# Patient Record
Sex: FEMALE | Race: WHITE | NOT HISPANIC OR LATINO | Employment: OTHER | URBAN - METROPOLITAN AREA
[De-identification: names, ages, dates, MRNs, and addresses within clinical notes are randomized per-mention and may not be internally consistent; named-entity substitution may affect disease eponyms.]

---

## 2017-10-13 ENCOUNTER — HISTORICAL (OUTPATIENT)
Dept: RADIOLOGY | Facility: HOSPITAL | Age: 82
End: 2017-10-13

## 2021-04-07 ENCOUNTER — HISTORICAL (OUTPATIENT)
Dept: ADMINISTRATIVE | Facility: HOSPITAL | Age: 86
End: 2021-04-07

## 2021-04-07 LAB
ABS NEUT (OLG): 6.31 X10(3)/MCL (ref 2.1–9.2)
ALBUMIN SERPL-MCNC: 3 GM/DL (ref 3.4–4.8)
ALBUMIN/GLOB SERPL: 0.9 RATIO (ref 1.1–2)
ALP SERPL-CCNC: 73 UNIT/L (ref 40–150)
ALT SERPL-CCNC: 16 UNIT/L (ref 0–55)
AST SERPL-CCNC: 15 UNIT/L (ref 5–34)
BILIRUB SERPL-MCNC: 0.3 MG/DL (ref 0.2–1.2)
BILIRUBIN DIRECT+TOT PNL SERPL-MCNC: 0.1 MG/DL (ref 0–0.8)
BILIRUBIN DIRECT+TOT PNL SERPL-MCNC: 0.2 MG/DL (ref 0–0.5)
BUN SERPL-MCNC: 23 MG/DL (ref 9.8–20.1)
CALCIUM SERPL-MCNC: 8.7 MG/DL (ref 8.4–10.2)
CHLORIDE SERPL-SCNC: 101 MMOL/L (ref 98–107)
CHOLEST SERPL-MCNC: 95 MG/DL
CHOLEST/HDLC SERPL: 3 {RATIO} (ref 0–5)
CO2 SERPL-SCNC: 28 MMOL/L (ref 23–31)
CREAT SERPL-MCNC: 1.24 MG/DL (ref 0.57–1.11)
DEPRECATED CALCIDIOL+CALCIFEROL SERPL-MC: 26.4 NG/ML (ref 30–80)
ERYTHROCYTE [DISTWIDTH] IN BLOOD BY AUTOMATED COUNT: 13.4 % (ref 11.5–17)
EST. AVERAGE GLUCOSE BLD GHB EST-MCNC: 159.9 MG/DL
GLOBULIN SER-MCNC: 3.2 GM/DL (ref 2.4–3.5)
GLUCOSE SERPL-MCNC: 203 MG/DL (ref 82–115)
HBA1C MFR BLD: 7.2 %
HCT VFR BLD AUTO: 37.5 % (ref 37–47)
HDLC SERPL-MCNC: 30 MG/DL (ref 40–60)
HGB BLD-MCNC: 11.7 GM/DL (ref 12–16)
LDLC SERPL CALC-MCNC: 36 MG/DL (ref 50–140)
MCH RBC QN AUTO: 29.5 PG (ref 27–31)
MCHC RBC AUTO-ENTMCNC: 31.2 GM/DL (ref 33–36)
MCV RBC AUTO: 94.5 FL (ref 80–94)
NRBC BLD AUTO-RTO: 0 % (ref 0–0.2)
PLATELET # BLD AUTO: 328 X10(3)/MCL (ref 130–400)
PMV BLD AUTO: 9.8 FL (ref 7.4–10.4)
POTASSIUM SERPL-SCNC: 4.4 MMOL/L (ref 3.5–5.1)
PROT SERPL-MCNC: 6.2 GM/DL (ref 5.8–7.6)
RBC # BLD AUTO: 3.97 X10(6)/MCL (ref 4.2–5.4)
SODIUM SERPL-SCNC: 139 MMOL/L (ref 136–145)
TRIGL SERPL-MCNC: 147 MG/DL (ref 0–150)
TSH SERPL-ACNC: 1.36 UIU/ML (ref 0.35–4.94)
VLDLC SERPL CALC-MCNC: 29 MG/DL
WBC # SPEC AUTO: 9.4 X10(3)/MCL (ref 4.5–11.5)

## 2021-06-02 ENCOUNTER — HISTORICAL (OUTPATIENT)
Dept: ADMINISTRATIVE | Facility: HOSPITAL | Age: 86
End: 2021-06-02

## 2021-06-02 LAB — PREALB SERPL-MCNC: 20.7 MG/DL (ref 14–37)

## 2021-07-01 ENCOUNTER — HISTORICAL (OUTPATIENT)
Dept: ADMINISTRATIVE | Facility: HOSPITAL | Age: 86
End: 2021-07-01

## 2021-07-01 LAB — DEPRECATED CALCIDIOL+CALCIFEROL SERPL-MC: 26.1 NG/ML (ref 30–80)

## 2021-10-05 ENCOUNTER — HISTORICAL (OUTPATIENT)
Dept: ADMINISTRATIVE | Facility: HOSPITAL | Age: 86
End: 2021-10-05

## 2021-10-05 LAB
ABS NEUT (OLG): 4.89 X10(3)/MCL (ref 2.1–9.2)
ALBUMIN SERPL-MCNC: 3.5 GM/DL (ref 3.4–4.8)
ALBUMIN/GLOB SERPL: 1 RATIO (ref 1.1–2)
ALP SERPL-CCNC: 85 UNIT/L (ref 40–150)
ALT SERPL-CCNC: 31 UNIT/L (ref 0–55)
AST SERPL-CCNC: 19 UNIT/L (ref 5–34)
BILIRUB SERPL-MCNC: 0.4 MG/DL (ref 0.2–1.2)
BILIRUBIN DIRECT+TOT PNL SERPL-MCNC: 0.2 MG/DL (ref 0–0.5)
BILIRUBIN DIRECT+TOT PNL SERPL-MCNC: 0.2 MG/DL (ref 0–0.8)
BUN SERPL-MCNC: 18.6 MG/DL (ref 9.8–20.1)
CALCIUM SERPL-MCNC: 9.6 MG/DL (ref 8.4–10.2)
CHLORIDE SERPL-SCNC: 100 MMOL/L (ref 98–107)
CHOLEST SERPL-MCNC: 112 MG/DL
CHOLEST/HDLC SERPL: 3 {RATIO} (ref 0–5)
CO2 SERPL-SCNC: 27 MMOL/L (ref 23–31)
CREAT SERPL-MCNC: 1.31 MG/DL (ref 0.57–1.11)
DEPRECATED CALCIDIOL+CALCIFEROL SERPL-MC: 34.6 NG/ML (ref 30–80)
ERYTHROCYTE [DISTWIDTH] IN BLOOD BY AUTOMATED COUNT: 12.6 % (ref 11.5–17)
EST. AVERAGE GLUCOSE BLD GHB EST-MCNC: 165.7 MG/DL
GLOBULIN SER-MCNC: 3.5 GM/DL (ref 2.4–3.5)
GLUCOSE SERPL-MCNC: 132 MG/DL (ref 82–115)
HBA1C MFR BLD: 7.4 %
HCT VFR BLD AUTO: 41.8 % (ref 37–47)
HDLC SERPL-MCNC: 36 MG/DL (ref 40–60)
HGB BLD-MCNC: 13.7 GM/DL (ref 12–16)
LDLC SERPL CALC-MCNC: 49 MG/DL (ref 50–140)
MCH RBC QN AUTO: 30.3 PG (ref 27–31)
MCHC RBC AUTO-ENTMCNC: 32.8 GM/DL (ref 33–36)
MCV RBC AUTO: 92.5 FL (ref 80–94)
NRBC BLD AUTO-RTO: 0 % (ref 0–0.2)
PLATELET # BLD AUTO: 333 X10(3)/MCL (ref 130–400)
PMV BLD AUTO: 9.6 FL (ref 7.4–10.4)
POTASSIUM SERPL-SCNC: 4.1 MMOL/L (ref 3.5–5.1)
PROT SERPL-MCNC: 7 GM/DL (ref 5.8–7.6)
RBC # BLD AUTO: 4.52 X10(6)/MCL (ref 4.2–5.4)
SODIUM SERPL-SCNC: 139 MMOL/L (ref 136–145)
TRIGL SERPL-MCNC: 133 MG/DL (ref 0–150)
TSH SERPL-ACNC: 2.75 UIU/ML (ref 0.35–4.94)
VLDLC SERPL CALC-MCNC: 27 MG/DL
WBC # SPEC AUTO: 10 X10(3)/MCL (ref 4.5–11.5)

## 2022-01-04 ENCOUNTER — HISTORICAL (OUTPATIENT)
Dept: ADMINISTRATIVE | Facility: HOSPITAL | Age: 87
End: 2022-01-04

## 2022-01-04 LAB — DEPRECATED CALCIDIOL+CALCIFEROL SERPL-MC: 29.4 NG/ML (ref 30–80)

## 2022-04-04 ENCOUNTER — HISTORICAL (OUTPATIENT)
Dept: ADMINISTRATIVE | Facility: HOSPITAL | Age: 87
End: 2022-04-04

## 2022-04-04 LAB
ABS NEUT (OLG): 4.67 (ref 2.1–9.2)
ALBUMIN SERPL-MCNC: 3 G/DL (ref 3.4–4.8)
ALBUMIN/GLOB SERPL: 0.9 {RATIO} (ref 1.1–2)
ALP SERPL-CCNC: 76 U/L (ref 40–150)
ALT SERPL-CCNC: 18 U/L (ref 0–55)
AST SERPL-CCNC: 17 U/L (ref 5–34)
BILIRUB SERPL-MCNC: 0.4 MG/DL (ref 0.2–1.2)
BILIRUBIN DIRECT+TOT PNL SERPL-MCNC: 0.2 (ref 0–0.5)
BILIRUBIN DIRECT+TOT PNL SERPL-MCNC: 0.2 (ref 0–0.8)
BUN SERPL-MCNC: 19.4 MG/DL (ref 9.8–20.1)
CALCIUM SERPL-MCNC: 8.7 MG/DL (ref 8.4–10.2)
CHLORIDE SERPL-SCNC: 105 MMOL/L (ref 98–107)
CHOLEST SERPL-MCNC: 101 MG/DL
CHOLEST/HDLC SERPL: 3 {RATIO} (ref 0–5)
CO2 SERPL-SCNC: 27 MMOL/L (ref 23–31)
CREAT SERPL-MCNC: 1.19 MG/DL (ref 0.57–1.11)
DEPRECATED CALCIDIOL+CALCIFEROL SERPL-MC: 34.7 NG/ML (ref 30–80)
ERYTHROCYTE [DISTWIDTH] IN BLOOD BY AUTOMATED COUNT: 12.8 % (ref 11.5–17)
EST. AVERAGE GLUCOSE BLD GHB EST-MCNC: 148.5 MG/DL
GLOBULIN SER-MCNC: 3.2 G/DL (ref 2.4–3.5)
GLUCOSE SERPL-MCNC: 125 MG/DL (ref 82–115)
HBA1C MFR BLD: 6.8 %
HCT VFR BLD AUTO: 40.3 % (ref 37–47)
HDLC SERPL-MCNC: 31 MG/DL (ref 40–60)
HEMOLYSIS INTERF INDEX SERPL-ACNC: 3
HGB BLD-MCNC: 13 G/DL (ref 12–16)
ICTERIC INTERF INDEX SERPL-ACNC: 0
LDLC SERPL CALC-MCNC: 45 MG/DL (ref 50–140)
LIPEMIC INTERF INDEX SERPL-ACNC: -1
MCH RBC QN AUTO: 30.2 PG (ref 27–31)
MCHC RBC AUTO-ENTMCNC: 32.3 G/DL (ref 33–36)
MCV RBC AUTO: 93.5 FL (ref 80–94)
NRBC BLD AUTO-RTO: 0 % (ref 0–0.2)
PLATELET # BLD AUTO: 320 10*3/UL (ref 130–400)
PMV BLD AUTO: 9.8 FL (ref 7.4–10.4)
POTASSIUM SERPL-SCNC: 4.1 MMOL/L (ref 3.5–5.1)
PROT SERPL-MCNC: 6.2 G/DL (ref 5.8–7.6)
RBC # BLD AUTO: 4.31 10*6/UL (ref 4.2–5.4)
SODIUM SERPL-SCNC: 142 MMOL/L (ref 136–145)
TRIGL SERPL-MCNC: 127 MG/DL (ref 0–150)
TSH SERPL-ACNC: 2.22 M[IU]/L (ref 0.35–4.94)
VLDLC SERPL CALC-MCNC: 25 MG/DL
WBC # SPEC AUTO: 9.2 10*3/UL (ref 4.5–11.5)

## 2022-06-05 PROCEDURE — 80053 COMPREHEN METABOLIC PANEL: CPT | Performed by: FAMILY MEDICINE

## 2022-06-05 PROCEDURE — 85025 COMPLETE CBC W/AUTO DIFF WBC: CPT | Performed by: FAMILY MEDICINE

## 2022-06-05 PROCEDURE — 87502 INFLUENZA DNA AMP PROBE: CPT | Performed by: FAMILY MEDICINE

## 2022-06-16 ENCOUNTER — LAB REQUISITION (OUTPATIENT)
Dept: LAB | Facility: HOSPITAL | Age: 87
End: 2022-06-16
Attending: FAMILY MEDICINE
Payer: MEDICARE

## 2022-06-16 DIAGNOSIS — J44.9 CHRONIC OBSTRUCTIVE PULMONARY DISEASE, UNSPECIFIED: ICD-10-CM

## 2022-06-16 LAB
ALBUMIN SERPL-MCNC: 2.8 GM/DL (ref 3.4–4.8)
ALBUMIN/GLOB SERPL: 0.8 RATIO (ref 1.1–2)
ALP SERPL-CCNC: 72 UNIT/L (ref 40–150)
ALT SERPL-CCNC: 13 UNIT/L (ref 0–55)
AST SERPL-CCNC: 12 UNIT/L (ref 5–34)
BASOPHILS # BLD AUTO: 0.09 X10(3)/MCL (ref 0–0.2)
BASOPHILS NFR BLD AUTO: 0.9 %
BILIRUBIN DIRECT+TOT PNL SERPL-MCNC: 0.3 MG/DL
BUN SERPL-MCNC: 18.6 MG/DL (ref 9.8–20.1)
CALCIUM SERPL-MCNC: 8.8 MG/DL (ref 8.4–10.2)
CHLORIDE SERPL-SCNC: 104 MMOL/L (ref 98–107)
CO2 SERPL-SCNC: 25 MMOL/L (ref 23–31)
CREAT SERPL-MCNC: 1.16 MG/DL (ref 0.55–1.02)
EOSINOPHIL # BLD AUTO: 0.16 X10(3)/MCL (ref 0–0.9)
EOSINOPHIL NFR BLD AUTO: 1.6 %
ERYTHROCYTE [DISTWIDTH] IN BLOOD BY AUTOMATED COUNT: 13.2 % (ref 11.5–17)
FLUAV AG UPPER RESP QL IA.RAPID: NOT DETECTED
FLUBV AG UPPER RESP QL IA.RAPID: NOT DETECTED
GLOBULIN SER-MCNC: 3.6 GM/DL (ref 2.4–3.5)
GLUCOSE SERPL-MCNC: 217 MG/DL (ref 82–115)
HCT VFR BLD AUTO: 39.8 % (ref 37–47)
HGB BLD-MCNC: 12.7 GM/DL (ref 12–16)
IMM GRANULOCYTES # BLD AUTO: 0.05 X10(3)/MCL (ref 0–0.02)
IMM GRANULOCYTES NFR BLD AUTO: 0.5 % (ref 0–0.43)
LYMPHOCYTES # BLD AUTO: 3.1 X10(3)/MCL (ref 0.6–4.6)
LYMPHOCYTES NFR BLD AUTO: 31.8 %
MCH RBC QN AUTO: 30.7 PG (ref 27–31)
MCHC RBC AUTO-ENTMCNC: 31.9 MG/DL (ref 33–36)
MCV RBC AUTO: 96.1 FL (ref 80–94)
MONOCYTES # BLD AUTO: 0.47 X10(3)/MCL (ref 0.1–1.3)
MONOCYTES NFR BLD AUTO: 4.8 %
NEUTROPHILS # BLD AUTO: 5.9 X10(3)/MCL (ref 2.1–9.2)
NEUTROPHILS NFR BLD AUTO: 60.4 %
NRBC BLD AUTO-RTO: 0 %
PLATELET # BLD AUTO: 343 X10(3)/MCL (ref 130–400)
PMV BLD AUTO: 9.4 FL (ref 9.4–12.4)
POTASSIUM SERPL-SCNC: 3.8 MMOL/L (ref 3.5–5.1)
PROT SERPL-MCNC: 6.4 GM/DL (ref 5.8–7.6)
RBC # BLD AUTO: 4.14 X10(6)/MCL (ref 4.2–5.4)
SODIUM SERPL-SCNC: 139 MMOL/L (ref 136–145)
WBC # SPEC AUTO: 9.7 X10(3)/MCL (ref 4.5–11.5)

## 2022-07-29 ENCOUNTER — HOSPITAL ENCOUNTER (INPATIENT)
Facility: HOSPITAL | Age: 87
LOS: 7 days | Discharge: NURSING, REHAB OR LONG-TERM CARE | DRG: 871 | End: 2022-08-05
Attending: EMERGENCY MEDICINE | Admitting: INTERNAL MEDICINE
Payer: MEDICARE

## 2022-07-29 DIAGNOSIS — E87.8 HYPERCHLOREMIA: ICD-10-CM

## 2022-07-29 DIAGNOSIS — I49.9 ARRHYTHMIA: ICD-10-CM

## 2022-07-29 DIAGNOSIS — R79.89 ELEVATED D-DIMER: ICD-10-CM

## 2022-07-29 DIAGNOSIS — R53.1 WEAKNESS: ICD-10-CM

## 2022-07-29 DIAGNOSIS — J18.9 PNEUMONIA OF LEFT LOWER LOBE DUE TO INFECTIOUS ORGANISM: Primary | ICD-10-CM

## 2022-07-29 LAB
ALBUMIN SERPL-MCNC: 2.5 GM/DL (ref 3.4–4.8)
ALBUMIN/GLOB SERPL: 1 RATIO (ref 1.1–2)
ALP SERPL-CCNC: 52 UNIT/L (ref 40–150)
ALT SERPL-CCNC: 71 UNIT/L (ref 0–55)
APPEARANCE UR: CLEAR
AST SERPL-CCNC: 22 UNIT/L (ref 5–34)
BACTERIA #/AREA URNS AUTO: ABNORMAL /HPF
BASOPHILS # BLD AUTO: 0.05 X10(3)/MCL (ref 0–0.2)
BASOPHILS NFR BLD AUTO: 0.3 %
BILIRUB UR QL STRIP.AUTO: NEGATIVE MG/DL
BILIRUBIN DIRECT+TOT PNL SERPL-MCNC: 0.5 MG/DL
BUN SERPL-MCNC: 18.6 MG/DL (ref 9.8–20.1)
CALCIUM SERPL-MCNC: 7.3 MG/DL (ref 8.4–10.2)
CHLORIDE SERPL-SCNC: 115 MMOL/L (ref 98–107)
CO2 SERPL-SCNC: 13 MMOL/L (ref 23–31)
COLOR UR AUTO: YELLOW
CORRECTED TEMPERATURE (PCO2): 30 MMHG
CORRECTED TEMPERATURE (PH): 7.38
CORRECTED TEMPERATURE (PO2): 45 MMHG
CREAT SERPL-MCNC: 0.89 MG/DL (ref 0.55–1.02)
EOSINOPHIL # BLD AUTO: 0.2 X10(3)/MCL (ref 0–0.9)
EOSINOPHIL NFR BLD AUTO: 1.2 %
ERYTHROCYTE [DISTWIDTH] IN BLOOD BY AUTOMATED COUNT: 13.5 % (ref 11.5–17)
GLOBULIN SER-MCNC: 2.5 GM/DL (ref 2.4–3.5)
GLUCOSE SERPL-MCNC: 78 MG/DL (ref 82–115)
GLUCOSE SERPL-MCNC: 98 MG/DL (ref 70–110)
GLUCOSE UR QL STRIP.AUTO: NEGATIVE MG/DL
HCO3 UR-SCNC: 17.7 MMOL/L
HCT VFR BLD AUTO: 41.5 % (ref 37–47)
HGB BLD-MCNC: 13 G/DL
HGB BLD-MCNC: 13 GM/DL (ref 12–16)
IMM GRANULOCYTES # BLD AUTO: 0.42 X10(3)/MCL (ref 0–0.04)
IMM GRANULOCYTES NFR BLD AUTO: 2.6 %
KETONES UR QL STRIP.AUTO: NEGATIVE MG/DL
LACTATE SERPL-SCNC: 1.6 MMOL/L (ref 0.5–2.2)
LEUKOCYTE ESTERASE UR QL STRIP.AUTO: ABNORMAL UNIT/L
LYMPHOCYTES # BLD AUTO: 2.09 X10(3)/MCL (ref 0.6–4.6)
LYMPHOCYTES NFR BLD AUTO: 13 %
MAGNESIUM SERPL-MCNC: 1.4 MG/DL (ref 1.6–2.6)
MCH RBC QN AUTO: 30.5 PG (ref 27–31)
MCHC RBC AUTO-ENTMCNC: 31.3 MG/DL (ref 33–36)
MCV RBC AUTO: 97.4 FL (ref 80–94)
MONOCYTES # BLD AUTO: 1.02 X10(3)/MCL (ref 0.1–1.3)
MONOCYTES NFR BLD AUTO: 6.3 %
NEUTROPHILS # BLD AUTO: 12.3 X10(3)/MCL (ref 2.1–9.2)
NEUTROPHILS NFR BLD AUTO: 76.6 %
NITRITE UR QL STRIP.AUTO: NEGATIVE
NRBC BLD AUTO-RTO: 0 %
PCO2 BLDA: 30 MMHG
PH SMN: 7.38 [PH]
PH UR STRIP.AUTO: 5.5 [PH]
PLATELET # BLD AUTO: 223 X10(3)/MCL (ref 130–400)
PMV BLD AUTO: 9.7 FL (ref 7.4–10.4)
PO2 BLDA: 45 MMHG
POC BASE DEFICIT: -6.2 MMOL/L
POC COHB: 3.7 %
POC IONIZED CALCIUM: 1.04 MMOL/L
POC METHB: 1.2 %
POC O2HB: 79.6 %
POC SATURATED O2: 79.7 %
POC TEMPERATURE: 37 °C
POCT GLUCOSE: 98 MG/DL (ref 70–110)
POTASSIUM BLD-SCNC: 3 MMOL/L
POTASSIUM SERPL-SCNC: 3.4 MMOL/L (ref 3.5–5.1)
PROT SERPL-MCNC: 5 GM/DL (ref 5.8–7.6)
PROT UR QL STRIP.AUTO: ABNORMAL MG/DL
RBC # BLD AUTO: 4.26 X10(6)/MCL (ref 4.2–5.4)
RBC #/AREA URNS AUTO: <5 /HPF
RBC UR QL AUTO: NEGATIVE UNIT/L
SARS-COV-2 RDRP RESP QL NAA+PROBE: POSITIVE
SODIUM BLD-SCNC: 133 MMOL/L
SODIUM SERPL-SCNC: 137 MMOL/L (ref 136–145)
SP GR UR STRIP.AUTO: 1.01 (ref 1–1.03)
SPECIMEN SOURCE: NORMAL
SQUAMOUS #/AREA URNS AUTO: <5 /HPF
TROPONIN I SERPL-MCNC: 0.03 NG/ML (ref 0–0.04)
UROBILINOGEN UR STRIP-ACNC: 0.2 MG/DL
WBC # SPEC AUTO: 16.1 X10(3)/MCL (ref 4.5–11.5)
WBC #/AREA URNS AUTO: 18 /HPF

## 2022-07-29 PROCEDURE — 93010 EKG 12-LEAD: ICD-10-PCS | Mod: ,,, | Performed by: INTERNAL MEDICINE

## 2022-07-29 PROCEDURE — 87798 DETECT AGENT NOS DNA AMP: CPT | Performed by: INTERNAL MEDICINE

## 2022-07-29 PROCEDURE — 93005 ELECTROCARDIOGRAM TRACING: CPT

## 2022-07-29 PROCEDURE — 80053 COMPREHEN METABOLIC PANEL: CPT | Performed by: PHYSICIAN ASSISTANT

## 2022-07-29 PROCEDURE — 93010 ELECTROCARDIOGRAM REPORT: CPT | Mod: ,,, | Performed by: INTERNAL MEDICINE

## 2022-07-29 PROCEDURE — 82803 BLOOD GASES ANY COMBINATION: CPT

## 2022-07-29 PROCEDURE — 87040 BLOOD CULTURE FOR BACTERIA: CPT | Performed by: PHYSICIAN ASSISTANT

## 2022-07-29 PROCEDURE — 83605 ASSAY OF LACTIC ACID: CPT | Performed by: PHYSICIAN ASSISTANT

## 2022-07-29 PROCEDURE — 99285 EMERGENCY DEPT VISIT HI MDM: CPT | Mod: 25

## 2022-07-29 PROCEDURE — 27000207 HC ISOLATION

## 2022-07-29 PROCEDURE — 83735 ASSAY OF MAGNESIUM: CPT | Performed by: PHYSICIAN ASSISTANT

## 2022-07-29 PROCEDURE — 25000003 PHARM REV CODE 250: Performed by: INTERNAL MEDICINE

## 2022-07-29 PROCEDURE — 11000001 HC ACUTE MED/SURG PRIVATE ROOM

## 2022-07-29 PROCEDURE — 96361 HYDRATE IV INFUSION ADD-ON: CPT

## 2022-07-29 PROCEDURE — 87635 SARS-COV-2 COVID-19 AMP PRB: CPT | Performed by: EMERGENCY MEDICINE

## 2022-07-29 PROCEDURE — 99900035 HC TECH TIME PER 15 MIN (STAT)

## 2022-07-29 PROCEDURE — 96374 THER/PROPH/DIAG INJ IV PUSH: CPT

## 2022-07-29 PROCEDURE — 63600175 PHARM REV CODE 636 W HCPCS: Performed by: EMERGENCY MEDICINE

## 2022-07-29 PROCEDURE — 36415 COLL VENOUS BLD VENIPUNCTURE: CPT | Performed by: PHYSICIAN ASSISTANT

## 2022-07-29 PROCEDURE — 63600175 PHARM REV CODE 636 W HCPCS: Performed by: INTERNAL MEDICINE

## 2022-07-29 PROCEDURE — 25000003 PHARM REV CODE 250: Performed by: PHYSICIAN ASSISTANT

## 2022-07-29 PROCEDURE — 81001 URINALYSIS AUTO W/SCOPE: CPT | Performed by: PHYSICIAN ASSISTANT

## 2022-07-29 PROCEDURE — 85025 COMPLETE CBC W/AUTO DIFF WBC: CPT | Performed by: PHYSICIAN ASSISTANT

## 2022-07-29 PROCEDURE — 84484 ASSAY OF TROPONIN QUANT: CPT | Performed by: PHYSICIAN ASSISTANT

## 2022-07-29 RX ORDER — LANOLIN ALCOHOL/MO/W.PET/CERES
400 CREAM (GRAM) TOPICAL 2 TIMES DAILY
Status: DISPENSED | OUTPATIENT
Start: 2022-07-29 | End: 2022-08-03

## 2022-07-29 RX ORDER — TRAZODONE HYDROCHLORIDE 100 MG/1
100 TABLET ORAL NIGHTLY
COMMUNITY

## 2022-07-29 RX ORDER — LEVOFLOXACIN 5 MG/ML
750 INJECTION, SOLUTION INTRAVENOUS
Status: COMPLETED | OUTPATIENT
Start: 2022-07-29 | End: 2022-07-29

## 2022-07-29 RX ORDER — GLUCAGON 1 MG
1 KIT INJECTION
Status: DISCONTINUED | OUTPATIENT
Start: 2022-07-29 | End: 2022-07-29

## 2022-07-29 RX ORDER — ATORVASTATIN CALCIUM 40 MG/1
40 TABLET, FILM COATED ORAL DAILY
COMMUNITY

## 2022-07-29 RX ORDER — MELOXICAM 7.5 MG/1
7.5 TABLET ORAL DAILY
COMMUNITY

## 2022-07-29 RX ORDER — INSULIN ASPART 100 [IU]/ML
1-10 INJECTION, SOLUTION INTRAVENOUS; SUBCUTANEOUS EVERY 6 HOURS PRN
Status: DISCONTINUED | OUTPATIENT
Start: 2022-07-29 | End: 2022-07-29

## 2022-07-29 RX ORDER — TRAZODONE HYDROCHLORIDE 100 MG/1
100 TABLET ORAL NIGHTLY
Status: DISCONTINUED | OUTPATIENT
Start: 2022-07-29 | End: 2022-08-05 | Stop reason: HOSPADM

## 2022-07-29 RX ORDER — ENOXAPARIN SODIUM 100 MG/ML
30 INJECTION SUBCUTANEOUS EVERY 24 HOURS
Status: DISCONTINUED | OUTPATIENT
Start: 2022-07-29 | End: 2022-08-05 | Stop reason: HOSPADM

## 2022-07-29 RX ORDER — ONDANSETRON 2 MG/ML
4 INJECTION INTRAMUSCULAR; INTRAVENOUS EVERY 8 HOURS PRN
Status: DISCONTINUED | OUTPATIENT
Start: 2022-07-29 | End: 2022-08-05 | Stop reason: HOSPADM

## 2022-07-29 RX ORDER — CLOPIDOGREL BISULFATE 75 MG/1
75 TABLET ORAL DAILY
Status: DISCONTINUED | OUTPATIENT
Start: 2022-07-30 | End: 2022-08-05 | Stop reason: HOSPADM

## 2022-07-29 RX ORDER — IBUPROFEN 200 MG
16 TABLET ORAL
Status: DISCONTINUED | OUTPATIENT
Start: 2022-07-29 | End: 2022-08-05 | Stop reason: HOSPADM

## 2022-07-29 RX ORDER — GLUCAGON 1 MG
1 KIT INJECTION
Status: DISCONTINUED | OUTPATIENT
Start: 2022-07-29 | End: 2022-08-05 | Stop reason: HOSPADM

## 2022-07-29 RX ORDER — IBUPROFEN 200 MG
24 TABLET ORAL
Status: DISCONTINUED | OUTPATIENT
Start: 2022-07-29 | End: 2022-08-05 | Stop reason: HOSPADM

## 2022-07-29 RX ORDER — HYDRALAZINE HYDROCHLORIDE 25 MG/1
25 TABLET, FILM COATED ORAL 2 TIMES DAILY
COMMUNITY
End: 2022-08-05

## 2022-07-29 RX ORDER — DULOXETIN HYDROCHLORIDE 60 MG/1
60 CAPSULE, DELAYED RELEASE ORAL DAILY
COMMUNITY

## 2022-07-29 RX ORDER — LEVOFLOXACIN 5 MG/ML
750 INJECTION, SOLUTION INTRAVENOUS
Status: DISCONTINUED | OUTPATIENT
Start: 2022-07-29 | End: 2022-07-29

## 2022-07-29 RX ORDER — ACETAMINOPHEN 325 MG/1
650 TABLET ORAL EVERY 4 HOURS PRN
Status: DISCONTINUED | OUTPATIENT
Start: 2022-07-29 | End: 2022-08-05 | Stop reason: HOSPADM

## 2022-07-29 RX ORDER — INSULIN ASPART 100 [IU]/ML
0-5 INJECTION, SOLUTION INTRAVENOUS; SUBCUTANEOUS
Status: DISCONTINUED | OUTPATIENT
Start: 2022-07-29 | End: 2022-08-05 | Stop reason: HOSPADM

## 2022-07-29 RX ORDER — ACETAMINOPHEN 325 MG/1
650 TABLET ORAL EVERY 8 HOURS PRN
Status: DISCONTINUED | OUTPATIENT
Start: 2022-07-29 | End: 2022-08-05 | Stop reason: HOSPADM

## 2022-07-29 RX ORDER — POTASSIUM CHLORIDE 14.9 MG/ML
20 INJECTION INTRAVENOUS
Status: ACTIVE | OUTPATIENT
Start: 2022-07-29 | End: 2022-07-29

## 2022-07-29 RX ORDER — DULOXETIN HYDROCHLORIDE 30 MG/1
60 CAPSULE, DELAYED RELEASE ORAL DAILY
Status: DISCONTINUED | OUTPATIENT
Start: 2022-07-30 | End: 2022-07-30

## 2022-07-29 RX ORDER — MAGNESIUM SULFATE HEPTAHYDRATE 40 MG/ML
2 INJECTION, SOLUTION INTRAVENOUS
Status: DISPENSED | OUTPATIENT
Start: 2022-07-29 | End: 2022-07-29

## 2022-07-29 RX ORDER — ATORVASTATIN CALCIUM 40 MG/1
40 TABLET, FILM COATED ORAL DAILY
Status: DISCONTINUED | OUTPATIENT
Start: 2022-07-30 | End: 2022-08-05 | Stop reason: HOSPADM

## 2022-07-29 RX ORDER — CLOPIDOGREL BISULFATE 75 MG/1
75 TABLET ORAL DAILY
COMMUNITY

## 2022-07-29 RX ORDER — CLONAZEPAM 0.5 MG/1
0.5 TABLET ORAL 2 TIMES DAILY PRN
Status: DISCONTINUED | OUTPATIENT
Start: 2022-07-29 | End: 2022-08-05 | Stop reason: HOSPADM

## 2022-07-29 RX ORDER — CLONAZEPAM 0.5 MG/1
0.5 TABLET ORAL 2 TIMES DAILY PRN
COMMUNITY

## 2022-07-29 RX ORDER — AMLODIPINE BESYLATE 5 MG/1
5 TABLET ORAL DAILY
COMMUNITY

## 2022-07-29 RX ADMIN — LEVOFLOXACIN 750 MG: 5 INJECTION, SOLUTION INTRAVENOUS at 04:07

## 2022-07-29 RX ADMIN — TRAZODONE HYDROCHLORIDE 100 MG: 100 TABLET ORAL at 09:07

## 2022-07-29 RX ADMIN — ENOXAPARIN SODIUM 30 MG: 30 INJECTION SUBCUTANEOUS at 07:07

## 2022-07-29 RX ADMIN — SODIUM CHLORIDE 1000 ML: 9 INJECTION, SOLUTION INTRAVENOUS at 10:07

## 2022-07-29 RX ADMIN — SODIUM BICARBONATE: 84 INJECTION, SOLUTION INTRAVENOUS at 07:07

## 2022-07-29 RX ADMIN — Medication 400 MG: at 09:07

## 2022-07-29 RX ADMIN — LEUCINE, PHENYLALANINE, LYSINE, METHIONINE, ISOLEUCINE, VALINE, HISTIDINE, THREONINE, TRYPTOPHAN, ALANINE, GLYCINE, ARGININE, PROLINE, SERINE, TYROSINE, SODIUM ACETATE, DIBASIC POTASSIUM PHOSPHATE, MAGNESIUM CHLORIDE, SODIUM CHLORIDE, CALCIUM CHLORIDE, DEXTROSE
311; 238; 247; 170; 255; 247; 204; 179; 77; 880; 438; 489; 289; 213; 17; 297; 261; 51; 77; 33; 5 INJECTION INTRAVENOUS at 07:07

## 2022-07-29 NOTE — H&P
[facility] Hospital Medicine History & Physical Examination       Patient Name: Cleo Cornelius  MRN: [id number]  Patient Class: IP- Inpatient   Admission Date: 07/29/2022   Admitting Physician: Dottie Kuo MD   Length of Stay: 0  Attending Physician: Dottie Kuo MD  Primary Care Provider: No primary care provider on file.  Face-to-Face encounter date: 07/29/2022  Code Status:  Chief Complaint: Fatigue (Sent from [facility][1] for increased lethargy since yesterday; received 2L NS bolus yesterday; at baseline per NH; received 200ml NS per ems)    Source of Information: Patient. Medical Records      HISTORY OF PRESENT ILLNESS:   Cleo Cornelius is a 87 y.o. 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 Patient was COVID positive on 7/5/22. She had labs done at the NH and was given IV fluids for dehydration. She is not the best historian.    Initial ED VS include BP 95/50, HR 65, RR 18, SpO2 100%, temp 96.3F. Labs notable for WBC 16.1, potassium 3.4, chloride 115, CO2 13, glucose 78, magnesium 1.4, COVID positive. Blood cultures x2 pending. CXR revealed a left sided pleural effusion, also concerning for pneumonia. She was started on IV Levaquin. There was mention of her possibly needing a PEG tube, which family members are undecided about.     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, constipation, psoriasis    PAST SURGICAL HISTORY:   Angioplasty, tonsillectomy, cholecystectomy, total hysterectomy, bilateral cataracts, incision and drainage perineum    ALLERGIES:   Penicillins and Sulfa (sulfonamide antibiotics)    FAMILY HISTORY:    Reviewed and negative    SOCIAL HISTORY:   Denied alcohol, tobacco or illicit drug use    HOME MEDICATIONS:     Prior to Admission medications    Not on File       REVIEW OF SYSTEMS:   Except as documented, all other systems reviewed and negative     PHYSICAL EXAM:     VITAL SIGNS: 24 HRS MIN & MAX LAST   Temp  Min: 96.3 °F (35.7 °C)  Max: 96.3 °F (35.7 °C) 96.3 °F (35.7 °C)   BP  Min: 95/50  Max: 122/74 108/72   Pulse  Min: 65  Max: 87  76   Resp  Min: 18  Max: 25 (!) 21   SpO2  Min: 95 %  Max: 100 % 99 %       General appearance: Frail  female in no apparent distress.  HENT: Atraumatic head. Moist mucous membranes of oral cavity.  Eyes: Normal extraocular movements.   Neck: Supple.   Lungs: Clear to auscultation bilaterally.   Heart: Regular rate and rhythm. S1 and S2 present. No pedal edema.  Abdomen: Soft, non-distended, non-tender.   Extremities: No cyanosis, clubbing, or edema.  Skin: No Rash.   Neuro: Motor and sensory exams grossly intact. Globally weak  Psych/mental status: Lethargic but arousablet. Responds appropriately to questions.     LABS AND IMAGING:     Recent Labs   Lab 07/27/22  0603 07/29/22  0200 07/29/22  1405   WBC 14.4* 13.9* 16.1*   RBC 4.73 3.98* 4.26   HGB 14.5 12.2 13.0   HCT 42.5 36.6* 41.5   MCV 89.9 92.0 97.4*   MCH 30.7 30.7 30.5   MCHC 34.1 33.3 31.3*   RDW 13.4 13.3 13.5    237 223   MPV 9.7 9.9 9.7       Recent Labs   Lab 07/27/22  0603 07/29/22  0200 07/29/22  1402    138 137   K 4.4 3.1* 3.4*   CO2 18* 16* 13*   BUN 29.0* 27.7* 18.6   CREATININE 1.15* 1.09* 0.89   CALCIUM 8.8 7.7* 7.3*   MG  --   --  1.40*   ALBUMIN 3.1* 2.5* 2.5*   ALKPHOS 57 47 52   * 78* 71*   AST 55* 27 22   BILITOT 0.6 0.5 0.5       Microbiology Results (last 7 days)     Procedure Component Value Units Date/Time    Blood culture #2 **CANNOT BE ORDERED STAT** [[id number]] Collected: 07/29/22 1406    Order Status: Resulted Specimen: Blood from Arm, Right Updated: 07/29/22 1419    Blood  culture #1 **CANNOT BE ORDERED STAT** [[id number]] Collected: 07/29/22 1406    Order Status: Resulted Specimen: Blood from Hand, Right Updated: 07/29/22 1419           X-Ray Chest 1 View  Narrative: EXAMINATION:  XR CHEST 1 VIEW    CLINICAL HISTORY:  Weakness    TECHNIQUE:  Single view of the chest    COMPARISON:  11/26/2018    FINDINGS:  Prominent interstitial markings with left-sided effusion.    The cardiomediastinal silhouette is within normal limits.    No acute osseous abnormality.  Impression: Interval development of left-sided pleural effusion.  Recommend follow-up to resolution.    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, constipation, psoriasis     Plan:  Continue IV abx  Follow blood cultures  IV fluids per attending MD  CBGs with ISS  NPO  Labs in AM        VTE Prophylaxis: will be placed on SCD for DVT prophylaxis and will be advised to be as mobile as possible and sit in a chair as tolerated    __________________________________________________________________________  INPATIENT LIST OF MEDICATIONS     Current Facility-Administered Medications:     acetaminophen tablet 650 mg, 650 mg, Oral, Q8H PRN, REYNA Hawley-BC    acetaminophen tablet 650 mg, 650 mg, Oral, Q4H PRN, SHERI HawleyCNP-BC    ondansetron injection 4 mg, 4 mg, Intravenous, Q8H PRN, CALEB Hawley  No current outpatient medications on file.      Scheduled Meds:  Continuous Infusions:  PRN Meds:.acetaminophen, acetaminophen, ondansetron      Discharge Planning and Disposition: [id number]    I, Rosaura Aguirre, NP have reviewed and discussed the case  with Dottie Kuo MD  Please see the following addendum for further assessment and plan from the attending MD.    Rosaura Aguirre, AGACNP-BC  07/29/2022

## 2022-07-29 NOTE — ED NOTES
Assumed care, patient [facility] the ed [facility] for being more [facility]argic than usual . Dx with covid on July 5th. Staff at nursing home reports patient became more lethargic last night. Normal Gcs of 14. Patient has tenderness in abdomen but denies any pain. Patient appears weak.  strenght in hand and feet are weak. HX of CVA, stets DM and htn. On cardiac monitor.

## 2022-07-29 NOTE — ED PROVIDER NOTES
"Encounter Date: 7/29/2022    SCRIBE #1 NOTE: I, Ariadne Jaylan, am scribing for, and in the presence of,  Jameel Ocasio. I have scribed the following portions of the note - Other sections scribed: HPI,ROS,PE.       History     Chief Complaint   Patient presents with    Fatigue     Sent from [facility] for increased lethargy since yesterday; received 2L NS bolus yesterday; at baseline per NH; received 200ml NS per ems     88 y/o female presents to the ED sent from [facility] for increased lethargy onset yesterday. Pt was recently diagnosed with Covid on July 5th but recovered. This morning she was more lethargic than usual. Pt moans with ROM exam of arms and legs but states "it doesn't hurt her, she just likes to moan."    The history is provided by the patient. No  was used.   Fatigue  This is a new problem. The current episode started yesterday. The problem occurs constantly. The problem has not changed since onset.Pertinent negatives include no chest pain, no abdominal pain, no headaches and no shortness of breath. Nothing aggravates the symptoms. Nothing relieves the symptoms.     Review of patient's allergies indicates:   Allergen Reactions    Penicillins     Sulfa (sulfonamide antibiotics)      History reviewed. No pertinent past medical history.  History reviewed. No pertinent surgical history.  History reviewed. No pertinent family history.     Review of Systems   Constitutional: Positive for fatigue. Negative for chills and fever.   HENT: Negative for congestion and sore throat.    Eyes: Negative for visual disturbance.   Respiratory: Negative for cough and shortness of breath.    Cardiovascular: Negative for chest pain.   Gastrointestinal: Negative for abdominal pain, diarrhea, nausea and vomiting.   Genitourinary: Negative for dysuria.   Musculoskeletal: Negative for myalgias.   Skin: Negative for rash.   Neurological: Negative for weakness, numbness and headaches. "   All other systems reviewed and are negative.      Physical Exam     Initial Vitals [07/29/22 1014]   BP Pulse Resp Temp SpO2   (!) 95/50 65 18 96.3 °F (35.7 °C) 100 %      MAP       --         Physical Exam    Nursing note and vitals reviewed.  Constitutional: She appears well-developed and well-nourished.   Diffusively weak.   Frail appearing.    HENT:   Head: Normocephalic and atraumatic.   Mouth/Throat: Oropharynx is clear and moist.   Eyes: Pupils are equal, round, and reactive to light.   Neck: Neck supple.   Normal range of motion.  Cardiovascular: Normal rate, regular rhythm, normal heart sounds and intact distal pulses.   Pulmonary/Chest: Breath sounds normal.   Abdominal: Abdomen is soft. Bowel sounds are normal. There is no abdominal tenderness. There is no rebound.   Musculoskeletal:         General: No tenderness. Normal range of motion.      Cervical back: Normal range of motion and neck supple.     Neurological: She is alert and oriented to person, place, and time. She has normal strength. No sensory deficit. GCS score is 15. GCS eye subscore is 4. GCS verbal subscore is 5. GCS motor subscore is 6.   Barely follows commands.    Skin: Skin is warm and dry.   Psychiatric: She has a normal mood and affect.         ED Course   Procedures  Labs Reviewed   COMPREHENSIVE METABOLIC PANEL - Abnormal; Notable for the following components:       Result Value    Potassium Level 3.4 (*)     Chloride 115 (*)     Carbon Dioxide 13 (*)     Glucose Level 78 (*)     Calcium Level Total 7.3 (*)     Protein Total 5.0 (*)     Albumin Level 2.5 (*)     Albumin/Globulin Ratio 1.0 (*)     Alanine Aminotransferase 71 (*)     All other components within normal limits   URINALYSIS, REFLEX TO URINE CULTURE - Abnormal; Notable for the following components:    Protein, UA Trace (*)     Leukocyte Esterase, UA 2+ (*)     All other components within normal limits   CBC WITH DIFFERENTIAL - Abnormal; Notable for the following  components:    WBC 16.1 (*)     MCV 97.4 (*)     MCHC 31.3 (*)     Neut # 12.3 (*)     IG# 0.42 (*)     All other components within normal limits   MAGNESIUM - Abnormal; Notable for the following components:    Magnesium Level 1.40 (*)     All other components within normal limits   URINALYSIS, MICROSCOPIC - Abnormal; Notable for the following components:    WBC, UA 18 (*)     All other components within normal limits   LACTIC ACID, PLASMA - Normal   BLOOD CULTURE [facility]   BLOOD CULTURE [facility]   CBC W/ AUTO DIFFERENTIAL    Narrative:     The following orders were created for panel order CBC auto differential.  Procedure                               Abnormality         Status                     ---------                               -----------         ------                     CBC with Differential[[id number]]        Abnormal            Final result                 Please view results for these tests on the individual orders.   TROPONIN I     EKG Readings: (Independently Interpreted)   Initial Reading: No STEMI. Rhythm: Atrial Fibrillation. Heart Rate: 73. Conduction: RBBB. ST Segments: Normal ST Segments. T Waves: Normal. Axis: Normal.   Time:1027  Low voltage QRS.      ECG Results          EKG 12-lead (Final result)  Result time 07/29/22 11:50:16    Final result by Interface, Lab In [facility] (07/29/22 11:50:16)                 Narrative:    Test Reason : R53.1,    Vent. Rate : 073 BPM     Atrial Rate : 000 BPM     P-R Int : 000 ms          QRS Dur : 112 ms      QT Int : 444 ms       P-R-T Axes : 000 077 -53 degrees     QTc Int : 489 ms    Atrial fibrillation with premature ventricular or aberrantly conducted  complexes  Low voltage QRS  Right bundle branch block  Abnormal ECG  Confirmed by Luc Fernández MD ([id number]) on 7/29/2022 11:50:02 AM    Referred By:             Confirmed By:Luc Fernández MD                            Imaging Results          X-Ray Chest 1 View (Final result)  Result time 07/29/22 10:56:46     Final result by Tom Aldana MD (07/29/22 10:56:46)                 Impression:      Interval development of left-sided pleural effusion.  Recommend follow-up to resolution.      Electronically signed by: Tom Aldana  Date:    07/29/2022  Time:    10:56             Narrative:    EXAMINATION:  XR CHEST 1 VIEW    CLINICAL HISTORY:  Weakness    TECHNIQUE:  Single view of the chest    COMPARISON:  11/26/2018    FINDINGS:  Prominent interstitial markings with left-sided effusion.    The cardiomediastinal silhouette is within normal limits.    No acute osseous abnormality.                                 Medications   levoFLOXacin 750 mg/150 mL IVPB 750 mg (has no administration in time range)   sodium chloride 0.9% bolus 1,000 mL (0 mLs Intravenous Stopped 7/29/22 1130)     Medical Decision Making:   History:   Old Medical Records: I decided to obtain old medical records.  Differential Diagnosis:   UTI, sepsis, pneumonia, electrolyte abnormality, dehydration  Independently Interpreted Test(s):   I have ordered and independently interpreted EKG Reading(s) - see prior notes  Clinical Tests:   Lab Tests: Reviewed and Ordered  Radiological Study: Reviewed and Ordered  Medical Tests: Reviewed and Ordered  ED Management:  Patient hyperchloremic, questionable metabolic alkalosis versus acidosis.  Not sure if maybe the IV fluids she has been administered recently history/atraumatic shift even from less than 24 hours ago and a chloride level.  Regardless she has a leukocytosis, along with new effusion in the context of his persistent infiltrates so will admit for pneumonia, correction of her metabolic abnormalities.  I did speak with the patient's son, and the patient's daughter [place].  They will confer and discuss whether not they will proceed or wish to proceed with feeding tube as well.  Discussed with hospitalist and will admit          Scribe Attestation:   Scribe #1: I performed the  above scribed service and the documentation accurately describes the services I performed. I attest to the accuracy of the note.    Attending Attestation:           Physician Attestation for Scribe:  Physician Attestation Statement for Scribe #1: I, Jameel Ocasio, reviewed documentation, as scribed by Ariadne Tian in my presence, and it is both accurate and complete.                      Clinical Impression:   Final diagnoses:  [R53.1] Weakness  [J18.9] Pneumonia of left lower lobe due to infectious organism (Primary)  [E87.8] Hyperchloremia          ED Disposition Condition    Admit               Jameel Ocasio MD  07/29/22 0944

## 2022-07-30 LAB
ALBUMIN SERPL-MCNC: 2.7 GM/DL (ref 3.4–4.8)
ALBUMIN/GLOB SERPL: 0.3 RATIO (ref 1.1–2)
ALP SERPL-CCNC: 46 UNIT/L (ref 40–150)
ALT SERPL-CCNC: 63 UNIT/L (ref 0–55)
ANION GAP SERPL CALC-SCNC: 15 MEQ/L
AST SERPL-CCNC: 27 UNIT/L (ref 5–34)
BASE EXCESS ARTERIAL: 4.2 MMOL/L (ref -2–2)
BASOPHILS # BLD AUTO: 0.03 X10(3)/MCL (ref 0–0.2)
BASOPHILS NFR BLD AUTO: 0.3 %
BILIRUBIN DIRECT+TOT PNL SERPL-MCNC: 1.1 MG/DL
BUN SERPL-MCNC: 15 MG/DL (ref 9.8–20.1)
BUN SERPL-MCNC: 15.3 MG/DL (ref 9.8–20.1)
CALCIUM SERPL-MCNC: 8.1 MG/DL (ref 8.4–10.2)
CALCIUM SERPL-MCNC: 8.3 MG/DL (ref 8.4–10.2)
CHLORIDE SERPL-SCNC: 104 MMOL/L (ref 98–107)
CHLORIDE SERPL-SCNC: 97 MMOL/L (ref 98–107)
CO2 SERPL-SCNC: 15 MMOL/L (ref 23–31)
CO2 SERPL-SCNC: 20 MMOL/L (ref 23–31)
CORRECTED TEMPERATURE (PCO2): 27 MMHG (ref 35–45)
CORRECTED TEMPERATURE (PH): 7.58 (ref 7.35–7.45)
CORRECTED TEMPERATURE (PO2): 76 MMHG (ref 80–100)
CREAT SERPL-MCNC: 0.87 MG/DL (ref 0.55–1.02)
CREAT SERPL-MCNC: 0.91 MG/DL (ref 0.55–1.02)
CREAT/UREA NIT SERPL: 16
D DIMER PPP IA.FEU-MCNC: 1.45 UG/ML FEU (ref 0–0.5)
EOSINOPHIL # BLD AUTO: 0.17 X10(3)/MCL (ref 0–0.9)
EOSINOPHIL NFR BLD AUTO: 1.5 %
ERYTHROCYTE [DISTWIDTH] IN BLOOD BY AUTOMATED COUNT: 13.4 % (ref 11.5–17)
GLOBULIN SER-MCNC: 9.7 GM/DL (ref 2.4–3.5)
GLUCOSE SERPL-MCNC: 108 MG/DL (ref 82–115)
GLUCOSE SERPL-MCNC: 119 MG/DL (ref 82–115)
HCO3 ARTERIAL: 25.3 MMOL/L (ref 18–23)
HCO3 UR-SCNC: 25.3 MMOL/L
HCT VFR BLD AUTO: 33.4 % (ref 37–47)
HGB BLD-MCNC: 11.5 GM/DL (ref 12–16)
HGB BLD-MCNC: 12.2 G/DL (ref 12–16)
HGB BLD-MCNC: ABNORMAL G/DL
IMM GRANULOCYTES # BLD AUTO: 0.25 X10(3)/MCL (ref 0–0.04)
IMM GRANULOCYTES NFR BLD AUTO: 2.2 %
LACTATE SERPL-SCNC: 2.8 MMOL/L (ref 0.5–2.2)
LACTATE SERPL-SCNC: 3.4 MMOL/L (ref 0.5–2.2)
LACTATE SERPL-SCNC: 6.1 MMOL/L (ref 0.5–2.2)
LYMPHOCYTES # BLD AUTO: 1.64 X10(3)/MCL (ref 0.6–4.6)
LYMPHOCYTES NFR BLD AUTO: 14.1 %
MAGNESIUM SERPL-MCNC: 1.2 MG/DL (ref 1.6–2.6)
MCH RBC QN AUTO: 32.9 PG (ref 27–31)
MCHC RBC AUTO-ENTMCNC: 34.4 MG/DL (ref 33–36)
MCV RBC AUTO: 95.4 FL (ref 80–94)
MONOCYTES # BLD AUTO: 0.7 X10(3)/MCL (ref 0.1–1.3)
MONOCYTES NFR BLD AUTO: 6 %
NEUTROPHILS # BLD AUTO: 8.8 X10(3)/MCL (ref 2.1–9.2)
NEUTROPHILS NFR BLD AUTO: 75.9 %
NRBC BLD AUTO-RTO: 0 %
OSMOLALITY SERPL: 286 MOSM/KG (ref 280–300)
OSMOLALITY UR: 258 MOSM/KG (ref 300–1300)
PCO2 BLDA: 27 MMHG (ref 35–45)
PCO2 BLDA: ABNORMAL MM[HG]
PCO2 BLDA: ABNORMAL MM[HG]
PH SMN: 7.58 [PH] (ref 7.35–7.45)
PH SMN: ABNORMAL [PH]
PHOSPHATE SERPL-MCNC: 1.8 MG/DL (ref 2.3–4.7)
PLATELET # BLD AUTO: 200 X10(3)/MCL (ref 130–400)
PMV BLD AUTO: 11.2 FL (ref 7.4–10.4)
PO2 BLDA: 76 MMHG (ref 80–100)
PO2 BLDA: ABNORMAL MM[HG]
POC BASE DEFICIT: 4.2 MMOL/L (ref -2–2)
POC COHB: 1.2 %
POC COHB: ABNORMAL
POC IONIZED CALCIUM: 1.01 MMOL/L (ref 1.12–1.23)
POC IONIZED CALCIUM: ABNORMAL
POC METHB: 1.4 % (ref 0.4–1.5)
POC METHB: ABNORMAL
POC O2HB: 94.7 % (ref 94–97)
POC O2HB: ABNORMAL
POC SATURATED O2: 97 %
POC TEMPERATURE: 37 °C
POTASSIUM BLD-SCNC: 2.5 MMOL/L (ref 3.5–5)
POTASSIUM BLD-SCNC: ABNORMAL MMOL/L
POTASSIUM SERPL-SCNC: 3.3 MMOL/L (ref 3.5–5.1)
POTASSIUM SERPL-SCNC: 3.9 MMOL/L (ref 3.5–5.1)
PROT SERPL-MCNC: 12.4 GM/DL (ref 5.8–7.6)
RBC # BLD AUTO: 3.5 X10(6)/MCL (ref 4.2–5.4)
SATURATED O2 ARTERIAL, I-STAT: ABNORMAL
SODIUM BLD-SCNC: 131 MMOL/L (ref 137–145)
SODIUM BLD-SCNC: ABNORMAL MMOL/L
SODIUM SERPL-SCNC: 113 MMOL/L (ref 136–145)
SODIUM SERPL-SCNC: 124 MMOL/L (ref 136–145)
SODIUM SERPL-SCNC: 139 MMOL/L (ref 136–145)
SPECIMEN SOURCE: ABNORMAL
TSH SERPL-ACNC: 1.13 UIU/ML (ref 0.35–4.94)
WBC # SPEC AUTO: 11.6 X10(3)/MCL (ref 4.5–11.5)

## 2022-07-30 PROCEDURE — 11000001 HC ACUTE MED/SURG PRIVATE ROOM

## 2022-07-30 PROCEDURE — 80053 COMPREHEN METABOLIC PANEL: CPT | Performed by: NURSE PRACTITIONER

## 2022-07-30 PROCEDURE — 27000207 HC ISOLATION

## 2022-07-30 PROCEDURE — 25000003 PHARM REV CODE 250: Performed by: INTERNAL MEDICINE

## 2022-07-30 PROCEDURE — 63600175 PHARM REV CODE 636 W HCPCS: Performed by: INTERNAL MEDICINE

## 2022-07-30 PROCEDURE — 99900035 HC TECH TIME PER 15 MIN (STAT)

## 2022-07-30 PROCEDURE — 83930 ASSAY OF BLOOD OSMOLALITY: CPT | Performed by: INTERNAL MEDICINE

## 2022-07-30 PROCEDURE — 83735 ASSAY OF MAGNESIUM: CPT | Performed by: INTERNAL MEDICINE

## 2022-07-30 PROCEDURE — 36600 WITHDRAWAL OF ARTERIAL BLOOD: CPT

## 2022-07-30 PROCEDURE — C1751 CATH, INF, PER/CENT/MIDLINE: HCPCS

## 2022-07-30 PROCEDURE — 85025 COMPLETE CBC W/AUTO DIFF WBC: CPT | Performed by: NURSE PRACTITIONER

## 2022-07-30 PROCEDURE — S0030 INJECTION, METRONIDAZOLE: HCPCS | Performed by: INTERNAL MEDICINE

## 2022-07-30 PROCEDURE — 84295 ASSAY OF SERUM SODIUM: CPT | Performed by: INTERNAL MEDICINE

## 2022-07-30 PROCEDURE — B4185 PARENTERAL SOL 10 GM LIPIDS: HCPCS | Performed by: INTERNAL MEDICINE

## 2022-07-30 PROCEDURE — 82803 BLOOD GASES ANY COMBINATION: CPT

## 2022-07-30 PROCEDURE — 85379 FIBRIN DEGRADATION QUANT: CPT | Performed by: INTERNAL MEDICINE

## 2022-07-30 PROCEDURE — 84443 ASSAY THYROID STIM HORMONE: CPT | Performed by: INTERNAL MEDICINE

## 2022-07-30 PROCEDURE — 83935 ASSAY OF URINE OSMOLALITY: CPT | Performed by: INTERNAL MEDICINE

## 2022-07-30 PROCEDURE — 84100 ASSAY OF PHOSPHORUS: CPT | Performed by: INTERNAL MEDICINE

## 2022-07-30 PROCEDURE — 36415 COLL VENOUS BLD VENIPUNCTURE: CPT | Performed by: NURSE PRACTITIONER

## 2022-07-30 PROCEDURE — 36415 COLL VENOUS BLD VENIPUNCTURE: CPT | Performed by: INTERNAL MEDICINE

## 2022-07-30 PROCEDURE — 83605 ASSAY OF LACTIC ACID: CPT | Performed by: INTERNAL MEDICINE

## 2022-07-30 PROCEDURE — 36569 INSJ PICC 5 YR+ W/O IMAGING: CPT

## 2022-07-30 RX ORDER — SODIUM CHLORIDE 0.9 % (FLUSH) 0.9 %
10 SYRINGE (ML) INJECTION
Status: DISCONTINUED | OUTPATIENT
Start: 2022-07-30 | End: 2022-08-05 | Stop reason: HOSPADM

## 2022-07-30 RX ORDER — MAGNESIUM SULFATE HEPTAHYDRATE 40 MG/ML
2 INJECTION, SOLUTION INTRAVENOUS ONCE
Status: COMPLETED | OUTPATIENT
Start: 2022-07-30 | End: 2022-07-30

## 2022-07-30 RX ORDER — SODIUM CHLORIDE 0.9 % (FLUSH) 0.9 %
10 SYRINGE (ML) INJECTION EVERY 6 HOURS
Status: DISCONTINUED | OUTPATIENT
Start: 2022-07-31 | End: 2022-08-05 | Stop reason: HOSPADM

## 2022-07-30 RX ORDER — METRONIDAZOLE 500 MG/100ML
500 INJECTION, SOLUTION INTRAVENOUS
Status: DISCONTINUED | OUTPATIENT
Start: 2022-07-30 | End: 2022-08-03

## 2022-07-30 RX ORDER — CIPROFLOXACIN 2 MG/ML
400 INJECTION, SOLUTION INTRAVENOUS
Status: DISCONTINUED | OUTPATIENT
Start: 2022-07-30 | End: 2022-08-01

## 2022-07-30 RX ADMIN — METRONIDAZOLE 500 MG: 500 INJECTION, SOLUTION INTRAVENOUS at 03:07

## 2022-07-30 RX ADMIN — MAGNESIUM SULFATE HEPTAHYDRATE 2 G: 40 INJECTION, SOLUTION INTRAVENOUS at 12:07

## 2022-07-30 RX ADMIN — ENOXAPARIN SODIUM 30 MG: 30 INJECTION SUBCUTANEOUS at 06:07

## 2022-07-30 RX ADMIN — VANCOMYCIN HYDROCHLORIDE 1750 MG: 1 INJECTION, POWDER, LYOPHILIZED, FOR SOLUTION INTRAVENOUS at 11:07

## 2022-07-30 RX ADMIN — METRONIDAZOLE 500 MG: 500 INJECTION, SOLUTION INTRAVENOUS at 11:07

## 2022-07-30 RX ADMIN — CIPROFLOXACIN 400 MG: 2 INJECTION, SOLUTION INTRAVENOUS at 06:07

## 2022-07-30 RX ADMIN — I.V. FAT EMULSION 250 ML: 20 EMULSION INTRAVENOUS at 02:07

## 2022-07-30 RX ADMIN — POTASSIUM PHOSPHATE, MONOBASIC AND POTASSIUM PHOSPHATE, DIBASIC 15 MMOL: 224; 236 INJECTION, SOLUTION, CONCENTRATE INTRAVENOUS at 03:07

## 2022-07-30 NOTE — PROGRESS NOTES
Pharmacokinetic Initial Assessment: IV Vancomycin    Assessment/Plan:    Initiate intravenous vancomycin with loading dose of 1750 mg once followed by a maintenance dose of vancomycin 1500 mg IV every 18 hours  Desired empiric serum trough concentration is 15 to 20 mcg/mL  Draw vancomycin trough level 60 min prior to fourth dose on 8/1 at approximately 2100  Pharmacy will continue to follow and monitor vancomycin.      Please contact pharmacy at extension [phone] with any questions regarding this assessment.     Thank you for the consult,   Trenton PuentesD       Patient brief summary:  Cleo Cornelius is a 87 y.o. female initiated on antimicrobial therapy with IV Vancomycin for treatment of suspected bacteremia    Drug Allergies:   Review of patient's allergies indicates:   Allergen Reactions    Penicillins     Sulfa (sulfonamide antibiotics)        Actual Body Weight:   81.6 kg    Renal Function:   Estimated Creatinine Clearance: 46.8 mL/min (based on SCr of 0.87 mg/dL).,     Dialysis Method (if applicable):  N/A    CBC (last 72 hours):  Recent Labs   Lab Result Units 07/29/22  0200 07/29/22  1405 07/30/22  0811   WBC x10(3)/mcL 13.9* 16.1* 11.6*   Hgb gm/dL 12.2 13.0 11.5*   Hct % 36.6* 41.5 33.4*   Platelet x10(3)/mcL 237 223 200   Mono % %  --  6.3 6.0   Eos % %  --  1.2 1.5   Basophil % %  --  0.3 0.3       Metabolic Panel (last 72 hours):  Recent Labs   Lab Result Units 07/28/22  2200 07/29/22  0200 07/29/22  1105 07/29/22  1402 07/30/22  0811   Sodium Level mmol/L  --  138  --  137 124*   Potassium Level mmol/L  --  3.1*  --  3.4* 3.3*   Chloride mmol/L  --  107  --  115* 97*   Carbon Dioxide mmol/L  --  16*  --  13* 15*   Glucose Level mg/dL  --  35*  --  78* 108   Glucose, UA mg/dL Negative  --  Negative  --   --    Blood Urea Nitrogen mg/dL  --  27.7*  --  18.6 15.3   Creatinine mg/dL  --  1.09*  --  0.89 0.87   Albumin Level gm/dL  --  2.5*  --  2.5* 2.7*   Bilirubin Total mg/dL  --  0.5  --  0.5 1.1    Alkaline Phosphatase unit/L  --  47  --  52 46   Aspartate Aminotransferase unit/L  --  27  --  22 27   Alanine Aminotransferase unit/L  --  78*  --  71* 63*   Magnesium Level mg/dL  --   --   --  1.40* 1.20*   Phosphorus Level mg/dL  --   --   --   --  1.8*       Drug levels (last 3 results):  No results for input(s): VANCOMYCINRA, VANCORANDOM, VANCOMYCINPE, VANCOPEAK, VANCOMYCINTR, VANCOTROUGH in the last 72 hours.    Microbiologic Results:  Microbiology Results (last 7 days)       Procedure Component Value Units Date/Time    Blood culture #2 **CANNOT BE ORDERED STAT** [[id number]]  (Abnormal) Collected: 07/29/22 1406    Order Status: Completed Specimen: Blood from Arm, Right Updated: 07/30/22 1322     GRAM STAIN Gram Positive Cocci, probable Staphylococcus      Seen in gram stain of broth only      1 of 1 Pediatric bottle positive     Respiratory Culture [[id number]] Collected: 07/29/22 2147    Order Status: Canceled Specimen: Sputum, Expectorated Updated: 07/29/22 2149    Blood culture #1 **CANNOT BE ORDERED STAT** [[id number]] Collected: 07/29/22 1406    Order Status: Resulted Specimen: Blood from Hand, Right Updated: 07/29/22 8379

## 2022-07-30 NOTE — PROGRESS NOTES
[facility]  Hospital Medicine Progress Note        Chief Complaint: Inpatient Follow-up for     HPI: 87 y.o. 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 Patient was COVID positive on 7/5/22. She had labs done at the NH and was given IV fluids for dehydration. She is not the best historian.     Initial ED VS include BP 95/50, HR 65, RR 18, SpO2 100%, temp 96.3F. Labs notable for WBC 16.1, potassium 3.4, chloride 115, CO2 13, glucose 78, magnesium 1.4, COVID positive. Blood cultures x2 pending. CXR revealed a left sided pleural effusion, also concerning for pneumonia. She was started on IV Levaquin. There was mention of her possibly needing a PEG tube, which family members are undecided about    Interval Hx:   Patient seen and examined this morning with family member bedside they denied any complaints apparently patient was diagnosed with COVID early this month    Objective/physical exam:  General: In no acute distress, afebrile  Chest: Clear to auscultation bilaterally  Heart: RRR, +S1, S2, no appreciable murmur  Abdomen: Soft, nontender, BS +  MSK: Warm, no lower extremity edema, no clubbing or cyanosis  Neurologic: Alert and oriented x4,     VITAL SIGNS: 24 HRS MIN & MAX LAST   Temp  Min: 98.8 °F (37.1 °C)  Max: 98.9 °F (37.2 °C) 98.8 °F (37.1 °C)   BP  Min: 89/57  Max: 128/68 (!) 89/57   Pulse  Min: 74  Max: 96  75   Resp  Min: 17  Max: 23 20   SpO2  Min: 95 %  Max: 99 % 97 %       Recent Labs   Lab 07/29/22  0200 07/29/22  1405 07/30/22  0811   WBC 13.9* 16.1* 11.6*   RBC 3.98* 4.26 3.50*   HGB 12.2 13.0 11.5*   HCT 36.6* 41.5 33.4*   MCV 92.0 97.4* 95.4*   MCH 30.7 30.5 32.9*   MCHC 33.3 31.3* 34.4   RDW 13.3 13.5 13.4    223 200   MPV 9.9 9.7  11.2*       Recent Labs   Lab 07/29/22  0200 07/29/22  1402 07/29/22  2115 07/30/22  0811 07/30/22  1247    137  --  124*  --    K 3.1* 3.4*  --  3.3*  --    CO2 16* 13*  --  15*  --    BUN 27.7* 18.6  --  15.3  --    CREATININE 1.09* 0.89  --  0.87  --    CALCIUM 7.7* 7.3*  --  8.3*  --    PH  --   --  7.38  --  7.58*   MG  --  1.40*  --  1.20*  --    ALBUMIN 2.5* 2.5*  --  2.7*  --    ALKPHOS 47 52  --  46  --    ALT 78* 71*  --  63*  --    AST 27 22  --  27  --    BILITOT 0.5 0.5  --  1.1  --           Microbiology Results (last 7 days)     Procedure Component Value Units Date/Time    Blood culture #2 **CANNOT BE ORDERED STAT** [[id number]]  (Abnormal) Collected: 07/29/22 1406    Order Status: Completed Specimen: Blood from Arm, Right Updated: 07/30/22 1322     GRAM STAIN Gram Positive Cocci, probable Staphylococcus      Seen in gram stain of broth only      1 of 1 Pediatric bottle positive     Respiratory Culture [[id number]] Collected: 07/29/22 2147    Order Status: Canceled Specimen: Sputum, Expectorated Updated: 07/29/22 2149    Blood culture #1 **CANNOT BE ORDERED STAT** [[id number]] Collected: 07/29/22 1406    Order Status: Resulted Specimen: Blood from Hand, Right Updated: 07/29/22 1419           See below for Radiology    Scheduled Med:   atorvastatin  40 mg Oral Daily    ciprofloxacin  400 mg Intravenous Q12H    clopidogreL  75 mg Oral Daily    DULoxetine  60 mg Oral Daily    enoxaparin  30 mg Subcutaneous Daily    fat emulsion 20%  250 mL Intravenous Daily    magnesium oxide  400 mg Oral BID    magnesium sulfate IVPB  2 g Intravenous Once    metronidazole  500 mg Intravenous Q8H    potassium phosphate IVPB  15 mmol Intravenous Once    traZODone  100 mg Oral QHS        Continuous Infusions:   sodium bicarbonate drip 70 mL/hr at 07/29/22 1915        PRN Meds:  acetaminophen, acetaminophen, clonazePAM, dextrose 50%, dextrose 50%, glucagon (human recombinant), glucose, glucose, insulin aspart  U-100, ondansetron       Assessment/Plan:  Sepsis  Lactic acidosis  COVID-19 positive  UTI present on admission  Metabolic acidosis  Hyponatremia  Hypokalemia  Hypomagnesemia and hypophosphatemia  Left-sided pleural effusion  Diabetes mellitus type 2  History of anxiety  Peripheral vascular disease  Major depression, insomnia, hypertension, hyperlipidemia, history of coronary artery disease  History of CVA with left-sided hemiplegia  Constipation  Psoriasis        We have repeated ABGs today, lactic acid was found to be 6.1  I have ordered stat CT of the abdomen and pelvis  Antibiotics have been changed to ciprofloxacin IV b.i.d. and Flagyl   Blood cultures x1 is growing Gram-positive cocci so have added vancomycin  I will discontinue Clinimix at this time as patient's sodium has dropped instead we will continue with bicarb drip this morning bicarb went up to 15  NPO at this time we have consulted speech  For hyponatremia have ordered urine and plasma osmolality and TSH  We have also ordered D-dimer        Critical care note:  Critical care diagnosis:  Severe metabolic acidosis on bicarb drip  Critical care interventions: Hands-on evaluation, review of labs/radiographs/records and discussion with patient and family if present  Critical care time spent: 31  minutes    VTE prophylaxis:     Patient condition:  Stable/Fair/Guarded/ Serious/ Critical    Anticipated discharge and Disposition:         All diagnosis and differential diagnosis have been reviewed; assessment and plan has been documented; I have personally reviewed the labs and test results that are presently available; I have reviewed the patients medication list; I have reviewed the consulting providers response and recommendations. I have reviewed or attempted to review medical records based upon their availability    All of the patient's questions have been  addressed and answered. Patient's is agreeable to the above stated plan. I will continue to monitor  closely and make adjustments to medical management as needed.  _____________________________________________________________________    Nutrition Status:    Radiology:  X-Ray Chest 1 View  Narrative: EXAMINATION:  XR CHEST 1 VIEW    CLINICAL HISTORY:  Weakness    TECHNIQUE:  Single view of the chest    COMPARISON:  11/26/2018    FINDINGS:  Prominent interstitial markings with left-sided effusion.    The cardiomediastinal silhouette is within normal limits.    No acute osseous abnormality.  Impression: Interval development of left-sided pleural effusion.  Recommend follow-up to resolution.    Electronically signed by: Tom Aldana  Date:    07/29/2022  Time:    10:56      Tamika Abreu MD   07/30/2022

## 2022-07-31 LAB
ALBUMIN SERPL-MCNC: 2.1 GM/DL (ref 3.4–4.8)
ALBUMIN/GLOB SERPL: 0.8 RATIO (ref 1.1–2)
ALP SERPL-CCNC: 47 UNIT/L (ref 40–150)
ALT SERPL-CCNC: 47 UNIT/L (ref 0–55)
AST SERPL-CCNC: 17 UNIT/L (ref 5–34)
BASOPHILS # BLD AUTO: 0.01 X10(3)/MCL (ref 0–0.2)
BASOPHILS NFR BLD AUTO: 0.1 %
BILIRUBIN DIRECT+TOT PNL SERPL-MCNC: 0.5 MG/DL
BUN SERPL-MCNC: 10.1 MG/DL (ref 9.8–20.1)
CALCIUM SERPL-MCNC: 7.5 MG/DL (ref 8.4–10.2)
CHLORIDE SERPL-SCNC: 103 MMOL/L (ref 98–107)
CO2 SERPL-SCNC: 22 MMOL/L (ref 23–31)
CREAT SERPL-MCNC: 0.82 MG/DL (ref 0.55–1.02)
EOSINOPHIL # BLD AUTO: 0.17 X10(3)/MCL (ref 0–0.9)
EOSINOPHIL NFR BLD AUTO: 2.2 %
ERYTHROCYTE [DISTWIDTH] IN BLOOD BY AUTOMATED COUNT: 13.3 % (ref 11.5–17)
FERRITIN SERPL-MCNC: 232.57 NG/ML (ref 4.63–204)
GLOBULIN SER-MCNC: 2.6 GM/DL (ref 2.4–3.5)
GLUCOSE SERPL-MCNC: 105 MG/DL (ref 82–115)
HCT VFR BLD AUTO: 33.4 % (ref 37–47)
HGB BLD-MCNC: 11 GM/DL (ref 12–16)
IMM GRANULOCYTES # BLD AUTO: 0.09 X10(3)/MCL (ref 0–0.04)
IMM GRANULOCYTES NFR BLD AUTO: 1.2 %
IRON SATN MFR SERPL: 28 % (ref 20–50)
IRON SERPL-MCNC: 37 UG/DL (ref 50–170)
LACTATE SERPL-SCNC: 1.6 MMOL/L (ref 0.5–2.2)
LACTATE SERPL-SCNC: 2.7 MMOL/L (ref 0.5–2.2)
LYMPHOCYTES # BLD AUTO: 1.08 X10(3)/MCL (ref 0.6–4.6)
LYMPHOCYTES NFR BLD AUTO: 14 %
MCH RBC QN AUTO: 30.6 PG (ref 27–31)
MCHC RBC AUTO-ENTMCNC: 32.9 MG/DL (ref 33–36)
MCV RBC AUTO: 93 FL (ref 80–94)
MONOCYTES # BLD AUTO: 0.52 X10(3)/MCL (ref 0.1–1.3)
MONOCYTES NFR BLD AUTO: 6.8 %
NEUTROPHILS # BLD AUTO: 5.8 X10(3)/MCL (ref 2.1–9.2)
NEUTROPHILS NFR BLD AUTO: 75.7 %
NRBC BLD AUTO-RTO: 0 %
PLATELET # BLD AUTO: 187 X10(3)/MCL (ref 130–400)
PMV BLD AUTO: 9.3 FL (ref 7.4–10.4)
POTASSIUM SERPL-SCNC: 2.6 MMOL/L (ref 3.5–5.1)
PROT SERPL-MCNC: 4.7 GM/DL (ref 5.8–7.6)
RBC # BLD AUTO: 3.59 X10(6)/MCL (ref 4.2–5.4)
SODIUM SERPL-SCNC: 135 MMOL/L (ref 136–145)
TIBC SERPL-MCNC: 134 UG/DL (ref 250–450)
TIBC SERPL-MCNC: 97 UG/DL (ref 70–310)
TRANSFERRIN SERPL-MCNC: 123 MG/DL
WBC # SPEC AUTO: 7.7 X10(3)/MCL (ref 4.5–11.5)

## 2022-07-31 PROCEDURE — 92610 EVALUATE SWALLOWING FUNCTION: CPT

## 2022-07-31 PROCEDURE — 83540 ASSAY OF IRON: CPT | Performed by: NURSE PRACTITIONER

## 2022-07-31 PROCEDURE — 25000003 PHARM REV CODE 250: Performed by: INTERNAL MEDICINE

## 2022-07-31 PROCEDURE — 83605 ASSAY OF LACTIC ACID: CPT | Performed by: INTERNAL MEDICINE

## 2022-07-31 PROCEDURE — 27000207 HC ISOLATION

## 2022-07-31 PROCEDURE — 85025 COMPLETE CBC W/AUTO DIFF WBC: CPT | Performed by: INTERNAL MEDICINE

## 2022-07-31 PROCEDURE — 63600175 PHARM REV CODE 636 W HCPCS: Performed by: INTERNAL MEDICINE

## 2022-07-31 PROCEDURE — 80053 COMPREHEN METABOLIC PANEL: CPT | Performed by: INTERNAL MEDICINE

## 2022-07-31 PROCEDURE — S0030 INJECTION, METRONIDAZOLE: HCPCS | Performed by: INTERNAL MEDICINE

## 2022-07-31 PROCEDURE — A4216 STERILE WATER/SALINE, 10 ML: HCPCS | Performed by: INTERNAL MEDICINE

## 2022-07-31 PROCEDURE — 82728 ASSAY OF FERRITIN: CPT | Performed by: NURSE PRACTITIONER

## 2022-07-31 PROCEDURE — 36415 COLL VENOUS BLD VENIPUNCTURE: CPT | Performed by: INTERNAL MEDICINE

## 2022-07-31 PROCEDURE — 63600175 PHARM REV CODE 636 W HCPCS: Performed by: NURSE PRACTITIONER

## 2022-07-31 PROCEDURE — 11000001 HC ACUTE MED/SURG PRIVATE ROOM

## 2022-07-31 RX ORDER — POTASSIUM CHLORIDE 14.9 MG/ML
40 INJECTION INTRAVENOUS 2 TIMES DAILY
Status: COMPLETED | OUTPATIENT
Start: 2022-07-31 | End: 2022-07-31

## 2022-07-31 RX ORDER — SODIUM CHLORIDE, SODIUM LACTATE, POTASSIUM CHLORIDE, CALCIUM CHLORIDE 600; 310; 30; 20 MG/100ML; MG/100ML; MG/100ML; MG/100ML
INJECTION, SOLUTION INTRAVENOUS CONTINUOUS
Status: DISCONTINUED | OUTPATIENT
Start: 2022-07-31 | End: 2022-08-02

## 2022-07-31 RX ADMIN — VANCOMYCIN HYDROCHLORIDE 1500 MG: 1.5 INJECTION, POWDER, LYOPHILIZED, FOR SOLUTION INTRAVENOUS at 05:07

## 2022-07-31 RX ADMIN — Medication 10 ML: at 09:07

## 2022-07-31 RX ADMIN — POTASSIUM CHLORIDE 40 MEQ: 14.9 INJECTION, SOLUTION INTRAVENOUS at 09:07

## 2022-07-31 RX ADMIN — POTASSIUM CHLORIDE 40 MEQ: 14.9 INJECTION, SOLUTION INTRAVENOUS at 02:07

## 2022-07-31 RX ADMIN — SODIUM CHLORIDE, POTASSIUM CHLORIDE, SODIUM LACTATE AND CALCIUM CHLORIDE: 600; 310; 30; 20 INJECTION, SOLUTION INTRAVENOUS at 11:07

## 2022-07-31 RX ADMIN — ENOXAPARIN SODIUM 30 MG: 30 INJECTION SUBCUTANEOUS at 05:07

## 2022-07-31 RX ADMIN — METRONIDAZOLE 500 MG: 500 INJECTION, SOLUTION INTRAVENOUS at 02:07

## 2022-07-31 RX ADMIN — CIPROFLOXACIN 400 MG: 2 INJECTION, SOLUTION INTRAVENOUS at 05:07

## 2022-07-31 RX ADMIN — SODIUM CHLORIDE, PRESERVATIVE FREE 10 ML: 5 INJECTION INTRAVENOUS at 12:07

## 2022-07-31 RX ADMIN — METRONIDAZOLE 500 MG: 500 INJECTION, SOLUTION INTRAVENOUS at 08:07

## 2022-07-31 RX ADMIN — SODIUM CHLORIDE, PRESERVATIVE FREE 10 ML: 5 INJECTION INTRAVENOUS at 05:07

## 2022-07-31 NOTE — PT/OT/SLP EVAL
"Speech Language Pathology Department  Clinical Swallow Evaluation    Patient Name:  Cleo Cornelius   MRN:  [id number]  Admitting Diagnosis: Weakness    Recommendations:     General Recommendations:  Dysphagia therapy  Diet recommendations:  NPO, Liquid Diet Level: NPO   Swallow Strategies/Precautions: n/a  General Precautions: Standard, NPO    History:     History reviewed. No pertinent past medical history.    History reviewed. No pertinent surgical history.    Home Diet: unknown     Current Method of Nutrition: NPO    Patient complaint: "I don't want any of that [referring to PO trials]"    Subjective     Patient uncooperative and lethargic.    Pain/Comfort: Pain Rating 1: 0/10      Objective:     Oral Musculature Evaluation  · Oral Musculature: general weakness  · Secretion Management: adequate  · Mandibular Strength and Mobility: WFL  · Oral Labial Strength and Mobility: impaired coordination  · Lingual Strength and Mobility: impaired strength  · Buccal Strength and Mobility: WFL    Consistency Fed By Oral Symptoms Pharyngeal Symptoms   Thin liquids SLP None None   Puree, Solids X X X     Limited PO trials due to patient's adamant refusal. Lethargic and confused.     Assessment:     Limited assessment; unable to safely recommend PO diet at this time. Will follow up with ST for continued evaluation at bedside.    Goals:   LTG: Patient will tolerate safest and least restrictive PO diet without signs/symptoms of aspiration.    STG: Patient will tolerate bedside PO trials with SLP.    Plan:     Patient to be seen:  daily   Plan of Care reviewed with:  patient   SLP Follow-Up:  Yes      Time Tracking:     SLP Treatment Date:   07/31/22  Speech Start Time:  0815  Speech Stop Time:  0830     Speech Total Time (min):  15 min    Billable minutes:  Swallow and Oral Function Evaluation, 15 minutes     07/31/2022             "

## 2022-07-31 NOTE — CONSULTS
NEPHROLOGY CONSULT     Consultant Attending:   Dr. Renaldo Reynolds    Reason for Consult:     Electrolyte Imbalance    Subjective:      History of Present Illness:  Cleo Cornelius is a 87 y.o. white female who  has no past medical history on file.. The patient presented to the [facility] on 7/29/2022 with a primary complaint of Fatigue (Sent from [facility] for increased lethargy since yesterday; received 2L NS bolus yesterday; at baseline per NH; received 200ml NS per ems)    This elderly lady presented to the emergency department on 07/29/2022 from the nursing home with increased lethargy this started about 24 hours prior to presentation.  She had recently been diagnosed with COVID on July 5th but apparently recovered.  She was found to be caused COVID positive on admission.  She was subsequently admitted.  I was consulted yesterday for reports of fluctuating hyponatremia.  She was on Clinimix.  I did speak with the attending physician yesterday.  Apparently upon my chart review and investigation today I find that hyponatremia was likely the result of lab error.  She does have some electrolyte disturbance for which we will address.  Renal status is within normal limits.  Potassium today is 2.6.  Magnesium appears to be 1.2 several days ago.  From a renal standpoint I will replace potassium and follow magnesium closely.  I will check iron studies for completeness.  TSH was drawn and is normal.  I will check a.m. cortisol for completeness.  Current vital signs are stable.  She is on room air.  Several family members are at the bedside on exam.  Other pertinent medical history includes peripheral vascular disease, depression, hypertension, hyperlipidemia, type 2 diabetes, coronary artery disease, previous CVA with left-sided residual hemiplegia, psoriasis, anxiety and anemia secondary to chronic disease.      Past Medical History:  History reviewed. No pertinent past medical history.    Past Surgical  History:  History reviewed. No pertinent surgical history.    Allergies:  Review of patient's allergies indicates:   Allergen Reactions    Penicillins     Sulfa (sulfonamide antibiotics)        Medications:   In-Hospital Scheduled Medications:   atorvastatin  40 mg Oral Daily    ciprofloxacin  400 mg Intravenous Q12H    clopidogreL  75 mg Oral Daily    enoxaparin  30 mg Subcutaneous Daily    magnesium oxide  400 mg Oral BID    metronidazole  500 mg Intravenous Q8H    sodium chloride 0.9%  10 mL Intravenous Q6H    traZODone  100 mg Oral QHS    vancomycin (VANCOCIN) IVPB  1,500 mg Intravenous Q18H      In-Hospital PRN Medications:  acetaminophen, acetaminophen, clonazePAM, dextrose 50%, dextrose 50%, glucagon (human recombinant), glucose, glucose, insulin aspart U-100, ondansetron, Flushing PICC Protocol **AND** sodium chloride 0.9% **AND** sodium chloride 0.9%   In-Hospital IV Infusion Medications:   lactated ringers 75 mL/hr at 07/31/22 1100      Home Medications:  Prior to Admission medications    Medication Sig Start Date End Date Taking? Authorizing Provider   amLODIPine (NORVASC) 5 MG tablet Take 5 mg by mouth once daily.    Historical Provider   atorvastatin (LIPITOR) 40 MG tablet Take 40 mg by mouth once daily.    Historical Provider   clonazePAM (KLONOPIN) 0.5 MG tablet Take 0.5 mg by mouth 2 (two) times daily as needed for Anxiety.    Historical Provider   clopidogreL (PLAVIX) 75 mg tablet Take 75 mg by mouth once daily.    Historical Provider   DULoxetine (CYMBALTA) 60 MG capsule Take 60 mg by mouth once daily.    Historical Provider   hydrALAZINE (APRESOLINE) 25 MG tablet Take 25 mg by mouth 2 (two) times a day.    Historical Provider   meloxicam (MOBIC) 7.5 MG tablet Take 7.5 mg by mouth once daily.    Historical Provider   traZODone (DESYREL) 100 MG tablet Take 100 mg by mouth every evening.    Historical Provider       Family History:  History reviewed. No pertinent family history.    Social  History:       Review of Systems:  Difficult to obtain         Objective:   Last 24 Hour Vital Signs:  BP  Min: 94/59  Max: 162/70  Temp  Av.9 °F (37.2 °C)  Min: 98.4 °F (36.9 °C)  Max: 99.1 °F (37.3 °C)  Pulse  Av.4  Min: 78  Max: 85  Resp  Av  Min: 18  Max: 18  SpO2  Av.6 %  Min: 96 %  Max: 97 %  I/O last 3 completed shifts:  In: 60 [P.O.:60]  Out: 50 [Urine:50]    Physical Examination:  Alert/ Pleasant  VSS  CV: HRR/ No Click/ No Rub  RESP:  Overall Clear  ABD: Soft/ (+) BS  EXT: No Edema    Laboratory Results:    Trended Lab Data:  Recent Labs   Lab 22  1402 22  1405 22  0811 22  1112 22  1545 22  0447   WBC  --  16.1* 11.6*  --   --  7.7   HGB  --  13.0 11.5*  --   --  11.0*   HCT  --  41.5 33.4*  --   --  33.4*   PLT  --  223 200  --   --  187   MCV  --  97.4* 95.4*  --   --  93.0   RDW  --  13.5 13.4  --   --  13.3     --  124* 113* 139 135*   K 3.4*  --  3.3*  --  3.9 2.6*   CO2 13*  --  15*  --  20* 22*   BUN 18.6  --  15.3  --  15.0 10.1   ALBUMIN 2.5*  --  2.7*  --   --  2.1*   BILITOT 0.5  --  1.1  --   --  0.5   AST 22  --  27  --   --  17   ALKPHOS 52  --  46  --   --  47   ALT 71*  --  63*  --   --  47           Microbiology Data:    Other Laboratory Data:    Other Results:      Radiology:  X-Ray Chest 1 View    Result Date: 2022  EXAMINATION: XR CHEST 1 VIEW CPT 23261 CLINICAL HISTORY: PICC Placement; COMPARISON: 2022 FINDINGS: Cardiomediastinal silhouette improved parenchymal changes to be essentially unchanged as compared with the previous exam. Interval insertion of right-sided PICC line tip projecting at the junction of the superior vena cava and right atrium     No significant change. PICC line insertion Electronically signed by: Benjamin Chin Date:    2022 Time:    08:57    X-Ray Chest 1 View    Result Date: 2022  EXAMINATION: XR CHEST 1 VIEW CLINICAL HISTORY: Weakness TECHNIQUE: Single view of the chest  COMPARISON: 11/26/2018 FINDINGS: Prominent interstitial markings with left-sided effusion. The cardiomediastinal silhouette is within normal limits. No acute osseous abnormality.     Interval development of left-sided pleural effusion.  Recommend follow-up to resolution. Electronically signed by: Tom Aldana Date:    07/29/2022 Time:    10:56    CT Head Without Contrast    Result Date: 7/30/2022  EXAMINATION: CT HEAD WITHOUT CONTRAST CLINICAL HISTORY: ; Mental status change, unknown cause; TECHNIQUE: Axial CT images were acquired without the intravenous administration of iodine-based contrast media.  Multiplanar reconstructions were accomplished by a CT technologist at a separate workstation and pushed to PACS for physician review. Automated tube current modulation, weight-based exposure dosing, and/or iterative reconstruction technique utilized to reach lowest reasonably achievable exposure rate.  DLP: 884 mGy*cm COMPARISON: None available at the time of the initial interpretation. FINDINGS: Images were reviewed in subdural, brain, soft tissue, and bone windows. Exam quality: Motion/streak artifact limits assessment of the posterior fossa. Hemorrhage:No evidence of acute hyperattenuating blood products. Parenchyma: There is diffuse white matter hypoattenuation, typical of chronic microvascular changes. No discrete mass, mass effect, or CT evidence of acute large vascular territory insult. Gray-white differentiation is preserved. Midline shift: None. CSF spaces: Proportional appearance of ventricular and sulcal enlargement. No hydrocephalus. No masses or fluid collections. Skull base: Mastoid air cells are well aerated. No focal abnormality. No suspicious findings of the sella. Scalp/Skull: No abnormalities. Vasculature: No focally hyperdense artery. Scattered carotid siphon calcifications are present. No abnormal densities within the dural sinuses. Facial structures: Fluid level and extensive mucoperiosteal  thickening noted at the sphenoid sinus, with less pronounced inflammatory changes of the bilateral maxillary sinuses.     1. Extensive age-related chronic intracranial changes with no evidence of hemorrhage or other acute abnormality. 2. Findings suggestive of moderate to severe sinusitis. 3. Additional chronic secondary details discussed above. Electronically signed by: Moody Vazquez Date:    07/30/2022 Time:    17:19    CT Abdomen Pelvis  Without Contrast    Result Date: 7/30/2022  EXAMINATION: CT ABDOMEN PELVIS WITHOUT CONTRAST CLINICAL HISTORY: Sepsis;lactic acidosis r/o ischemic bowel; TECHNIQUE: CT imaging of the abdomen and pelvis without intravenous contrast. Axial, coronal and sagittal reformatted images reviewed. Dose length product is 536 mGycm. Automatic exposure control, adjustment of mA/kV or iterative reconstruction technique used to limit radiation dose. COMPARISON: No relevant comparison studies available at the time of dictation. FINDINGS: Assessment of the visceral organs and vasculature is limited by the lack of IV contrast.  Evaluation also mildly limited by motion. Liver/biliary: No concerning hepatic findings. Suspect prior cholecystectomy.  No biliary dilatation appreciated. Pancreas: Normal. Spleen: Normal. Adrenals: Normal. Genitourinary: Bilateral renal vascular calcifications.  No definitive renal stone.  No ureteral stone or hydronephrosis.  Bladder within normal limits. Uterus not seen.  No adnexal mass. Stomach/bowel: Small hiatal hernia.  No bowel obstruction.  Normal appendix. Distal colonic diverticulosis.  Rectal tube noted with mild rectal wall thickening just above the tube tip and mild perirectal stranding. Lymph nodes: No pathologically enlarged lymph node identified with noncontrast technique. Peritoneum: No ascites or free air. Vasculature: Numerous aortic and iliac artery calcifications.  No abdominal aortic aneurysm or portal venous gas. Abdominal wall: Normal. Lung bases:  Partially visualized left lower lobe consolidation.  No significant pleural fluid. Bones: No acute osseous findings.     1. Mild rectal wall thickening just above the rectal tube tip with mild perirectal stranding.  This may relate to tube placement versus mild proctitis. 2. Left lower lobe consolidation is suggestive of pneumonia. Electronically signed by: Juarez Diop Date:    07/30/2022 Time:    15:20           Assessment/ Plan     A/P--NE 07/31    1---Hyponatremia---Review of the chart seems to indicate possible lab error yesterday prompting consult--Resolved at present/ TSH WNL/ Check AM Cortisol---  Likely related to Clinimix that she was on if Na was decreased  2---Hypokalemia--Replace K/ Follow Mg  3---COVID POSITIVE---Stable  4---STAPH Bacteremia (1/2 Bottles)---Vanc started  5---Anemia secondary to Chronic Disease--Check Fe studies/ Follow H&H  6---Deconditioning--Mobilize    IV--LR at 75cc/hr    ORDERS:  Total 80 MEQ KCL PO today  AM Cortisol  Fe studies  CBC/CMP/Mg/Phos in am                                Patient seen remotely due to active COVID-19 issues to limit exposure and PPE utilization.  Patient viewed remotely.  Full contact physical exam not pursued due to isolation issues.  Physical exam reviewed.  Agree with above.  Orders reviewed.  No complaints.    Initiate ANYI evaluation.  Renal status stable.  Hold clinimix and monitor Na.  Supplement K.  Monitor for renal improvement.  Continue supportive care.  Will follow.      Thanks for this consult!

## 2022-07-31 NOTE — PROGRESS NOTES
[facility] Hospital Medicine Progress Note        Chief Complaint: Inpatient Follow-up for     HPI: 87 y.o. 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 on 7/29/2022  from [facility] due to increased lethargy x1 day. Patient was COVID positive on 7/5/22. She had labs done at the NH and was given IV fluids for dehydration. She is not the best historian.     Initial ED VS include BP 95/50, HR 65, RR 18, SpO2 100%, temp 96.3F. Labs notable for WBC 16.1, potassium 3.4, chloride 115, CO2 13, glucose 78, magnesium 1.4, COVID positive. Blood cultures x2 pending. CXR revealed a left sided pleural effusion, also concerning for pneumonia. She was started on IV Levaquin. There was mention of her possibly needing a PEG tube, which family members are undecided about    Interval Hx:   Patient seen and examined this morning with patient's daughter bedside sleeping woke up to verbal stimuli updated patient's current status to patient's daughter  Objective/physical exam:  General: In no acute distress, afebrile  Chest: Clear to auscultation bilaterally  Heart: RRR, +S1, S2, no appreciable murmur  Abdomen: Soft, nontender, BS +  MSK: Warm, no lower extremity edema, no clubbing or cyanosis  Neurologic: Alert and oriented     VITAL SIGNS: 24 HRS MIN & MAX LAST   Temp  Min: 98.4 °F (36.9 °C)  Max: 99.1 °F (37.3 °C) 98.5 °F (36.9 °C)   BP  Min: 90/59  Max: 162/70 (!) 90/59   Pulse  Min: 64  Max: 85  64   Resp  Min: 16  Max: 16 16   SpO2  Min: 96 %  Max: 97 % 96 %       Recent Labs   Lab 07/29/22  1405 07/30/22  0811 07/31/22  0447   WBC 16.1* 11.6* 7.7   RBC 4.26 3.50* 3.59*   HGB 13.0 11.5* 11.0*   HCT 41.5 33.4* 33.4*   MCV 97.4* 95.4* 93.0   MCH 30.5 32.9* 30.6   MCHC 31.3* 34.4 32.9*   RDW 13.5 13.4 13.3    200 187   MPV 9.7 11.2*  9.3       Recent Labs   Lab 07/29/22  1402 07/29/22  2115 07/30/22  0811 07/30/22  1112 07/30/22  1247 07/30/22  1545 07/31/22  0447     --  124* 113*  --  139 135*   K 3.4*  --  3.3*  --   --  3.9 2.6*   CO2 13*  --  15*  --   --  20* 22*   BUN 18.6  --  15.3  --   --  15.0 10.1   CREATININE 0.89  --  0.87  --   --  0.91 0.82   CALCIUM 7.3*  --  8.3*  --   --  8.1* 7.5*   PH  --  7.38  --   --  7.58*  --   --    MG 1.40*  --  1.20*  --   --   --   --    ALBUMIN 2.5*  --  2.7*  --   --   --  2.1*   ALKPHOS 52  --  46  --   --   --  47   ALT 71*  --  63*  --   --   --  47   AST 22  --  27  --   --   --  17   BILITOT 0.5  --  1.1  --   --   --  0.5          Microbiology Results (last 7 days)     Procedure Component Value Units Date/Time    Blood culture #2 **CANNOT BE ORDERED STAT** [[id number]]  (Abnormal) Collected: 07/29/22 1406    Order Status: Completed Specimen: Blood from Arm, Right Updated: 07/31/22 0702     CULTURE, BLOOD ([facility]) Gram-positive coccus probable staph     Comment: Identification and Susceptibility to Follow        GRAM STAIN Gram Positive Cocci, probable Staphylococcus      Seen in gram stain of broth only      1 of 1 Pediatric bottle positive     Blood culture #1 **CANNOT BE ORDERED STAT** [[id number]]  (Normal) Collected: 07/29/22 1406    Order Status: Completed Specimen: Blood from Hand, Right Updated: 07/30/22 1502     CULTURE, BLOOD ([facility]) No Growth At 24 Hours    Respiratory Culture [[id number]] Collected: 07/29/22 2147    Order Status: Canceled Specimen: Sputum, Expectorated Updated: 07/29/22 2149           See below for Radiology    Scheduled Med:   atorvastatin  40 mg Oral Daily    ciprofloxacin  400 mg Intravenous Q12H    clopidogreL  75 mg Oral Daily    enoxaparin  30 mg Subcutaneous Daily    magnesium oxide  400 mg Oral BID    metronidazole  500 mg Intravenous Q8H    potassium chloride  40 mEq Intravenous BID    sodium chloride 0.9%  10 mL Intravenous Q6H    traZODone  100  mg Oral QHS    vancomycin (VANCOCIN) IVPB  1,500 mg Intravenous Q18H        Continuous Infusions:   lactated ringers 75 mL/hr at 07/31/22 1100        PRN Meds:  acetaminophen, acetaminophen, clonazePAM, dextrose 50%, dextrose 50%, glucagon (human recombinant), glucose, glucose, insulin aspart U-100, ondansetron, Flushing PICC Protocol **AND** sodium chloride 0.9% **AND** sodium chloride 0.9%       Assessment/Plan:  Sepsis  Lactic acidosis  COVID-19 positive  UTI present on admission  Metabolic acidosis  Hyponatremia  Hypokalemia  Hypomagnesemia and hypophosphatemia  Left-sided pleural effusion  Diabetes mellitus type 2  History of anxiety  Peripheral vascular disease  Major depression, insomnia, hypertension, hyperlipidemia, history of coronary artery disease  History of CVA with left-sided hemiplegia  Constipation  Psoriasis      Most likely hyponatremia yesterday was lab error as we had repeated BMP and sodium was close to 135  This morning potassium is low will be given 60 mEq of IV  CT of the abdomen and pelvis had shown proctitis for which patient is on Flagyl and ciprofloxacin  Blood cultures 1/2 grew Staph patient is on vancomycin will await final culture and sensitivity results  Urine culture is growing Gram-negative rods awaiting final culture results patient is already on Cipro and that should take care of it  Speech consulted  Switch IV fluids to LR as bicarb is back to normal and patient was still NPO this morning  I will keep holding off on Clinimix for today  D-dimer was elevated I had ordered venous ultrasound and that was negative for any kind of DVT  Discussed above with patient's daughter who was in the room at the time of my visit  VTE prophylaxis:     Patient condition:  Stable/Fair/Guarded/ Serious/ Critical    Anticipated discharge and Disposition:         All diagnosis and differential diagnosis have been reviewed; assessment and plan has been documented; I have personally reviewed the labs  and test results that are presently available; I have reviewed the patients medication list; I have reviewed the consulting providers response and recommendations. I have reviewed or attempted to review medical records based upon their availability    All of the patient's questions have been  addressed and answered. Patient's is agreeable to the above stated plan. I will continue to monitor closely and make adjustments to medical management as needed.  _____________________________________________________________________    Nutrition Status:    Radiology:  X-Ray Chest 1 View  Narrative: EXAMINATION:  XR CHEST 1 VIEW    CPT 21393    CLINICAL HISTORY:  PICC Placement;    COMPARISON:  July 29, 2022    FINDINGS:  Cardiomediastinal silhouette improved parenchymal changes to be essentially unchanged as compared with the previous exam.    Interval insertion of right-sided PICC line tip projecting at the junction of the superior vena cava and right atrium  Impression: No significant change.    PICC line insertion    Electronically signed by: Benjamin Chin  Date:    07/31/2022  Time:    08:57      Tamika Abreu MD   07/31/2022

## 2022-07-31 NOTE — PROCEDURES
"Cleo Cornelius is a 87 y.o. female patient.    Temp: 99 °F (37.2 °C) (07/30/22 2139)  Pulse: 78 (07/30/22 2139)  Resp: 18 (07/30/22 1240)  BP: 94/64 (07/30/22 2139)  SpO2: 97 % (07/30/22 2139)  Weight: 81.6 kg (180 lb) (07/29/22 1014)  Height: 5' 2" (157.5 cm) (07/30/22 0415)    PICC  Date/Time: 7/30/2022 10:00 PM  Performed by: Mabel Hedrick, RN  Consent Done: Yes  Time out: Immediately prior to procedure a time out was called to verify the correct patient, procedure, equipment, support staff and site/side marked as required  Indications: med administration and vascular access  Anesthesia: local infiltration  Local anesthetic: lidocaine 1% without epinephrine  Anesthetic Total (mL): 5  Preparation: skin prepped with ChloraPrep  Skin prep agent dried: skin prep agent completely dried prior to procedure  Sterile barriers: all five maximum sterile barriers used - cap, mask, sterile gown, sterile gloves, and large sterile sheet  Hand hygiene: hand hygiene performed prior to central venous catheter insertion  Location details: right brachial  Catheter type: triple lumen  Catheter size: 5 Fr  Catheter Length: 35cm    Ultrasound guidance: yes  Vessel Caliber: medium and patent, compressibility normal  Needle advanced into vessel with real time Ultrasound guidance.  Guidewire confirmed in vessel.  Sterile sheath used.  Number of attempts: 1  Post-procedure: blood return through all ports, chlorhexidine patch and sterile dressing applied    Assessment: placement verified by x-ray          Chandler Hedrick  7/30/2022    "

## 2022-08-01 LAB
ALBUMIN SERPL-MCNC: 2.3 GM/DL (ref 3.4–4.8)
ALBUMIN/GLOB SERPL: 1 RATIO (ref 1.1–2)
ALP SERPL-CCNC: 50 UNIT/L (ref 40–150)
ALT SERPL-CCNC: 44 UNIT/L (ref 0–55)
AST SERPL-CCNC: 21 UNIT/L (ref 5–34)
B PARAP IS1001 DNA CT SPEC QN NAA+PROBE: NOT DETECTED
B PERT+PARAP PTXS1 CT SPEC QN NAA+PROBE: NOT DETECTED
BACTERIA BLD CULT: ABNORMAL
BASOPHILS # BLD AUTO: 0.02 X10(3)/MCL (ref 0–0.2)
BASOPHILS NFR BLD AUTO: 0.3 %
BILIRUBIN DIRECT+TOT PNL SERPL-MCNC: 0.5 MG/DL
BUN SERPL-MCNC: 5 MG/DL (ref 9.8–20.1)
CALCIUM SERPL-MCNC: 7.5 MG/DL (ref 8.4–10.2)
CHLAMYDIA SP IGG+IGM PNL TITR SER IF: NOT DETECTED
CHLORIDE SERPL-SCNC: 107 MMOL/L (ref 98–107)
CO2 SERPL-SCNC: 22 MMOL/L (ref 23–31)
CREAT SERPL-MCNC: 0.79 MG/DL (ref 0.55–1.02)
EOSINOPHIL # BLD AUTO: 0.22 X10(3)/MCL (ref 0–0.9)
EOSINOPHIL NFR BLD AUTO: 3.5 %
ERYTHROCYTE [DISTWIDTH] IN BLOOD BY AUTOMATED COUNT: 13.9 % (ref 11.5–17)
FLUAV AG UPPER RESP QL IA.RAPID: NOT DETECTED
FLUBV AG UPPER RESP QL IA.RAPID: NOT DETECTED
GLOBULIN SER-MCNC: 2.3 GM/DL (ref 2.4–3.5)
GLUCOSE SERPL-MCNC: 106 MG/DL (ref 82–115)
GRAM STN SPEC: ABNORMAL
HADV DNA NPH QL NAA+NON-PROBE: NOT DETECTED
HCOV 229E+OC43 RNA NPH QL NAA+PROBE: NOT DETECTED
HCOV HKU1 RNA SPEC QL NAA+PROBE: NOT DETECTED
HCOV NL63 RNA SPEC QL NAA+PROBE: NOT DETECTED
HCOV OC43 RNA SPEC QL NAA+PROBE: NOT DETECTED
HCT VFR BLD AUTO: 34.1 % (ref 37–47)
HGB BLD-MCNC: 10.9 GM/DL (ref 12–16)
HMPV RNA SPEC QL NAA+PROBE: NOT DETECTED
HPIV1 F GENE NPH QL NAA+PROBE: NOT DETECTED
HPIV2 L GENE NPH QL NAA+PROBE: NOT DETECTED
HPIV3 NP GENE NPH QL NAA+PROBE: NOT DETECTED
HPIV4 P GENE NPH QL NAA+PROBE: NOT DETECTED
IMM GRANULOCYTES # BLD AUTO: 0.06 X10(3)/MCL (ref 0–0.04)
IMM GRANULOCYTES NFR BLD AUTO: 0.9 %
LYMPHOCYTES # BLD AUTO: 1.6 X10(3)/MCL (ref 0.6–4.6)
LYMPHOCYTES NFR BLD AUTO: 25.2 %
M PNEUMO IGA SER-ACNC: NOT DETECTED
MAGNESIUM SERPL-MCNC: 1.4 MG/DL (ref 1.6–2.6)
MCH RBC QN AUTO: 30.3 PG (ref 27–31)
MCHC RBC AUTO-ENTMCNC: 32 MG/DL (ref 33–36)
MCV RBC AUTO: 94.7 FL (ref 80–94)
MONOCYTES # BLD AUTO: 0.7 X10(3)/MCL (ref 0.1–1.3)
MONOCYTES NFR BLD AUTO: 11 %
NEUTROPHILS # BLD AUTO: 3.7 X10(3)/MCL (ref 2.1–9.2)
NEUTROPHILS NFR BLD AUTO: 59.1 %
NRBC BLD AUTO-RTO: 0 %
PHOSPHATE SERPL-MCNC: 1.8 MG/DL (ref 2.3–4.7)
PLATELET # BLD AUTO: 181 X10(3)/MCL (ref 130–400)
PMV BLD AUTO: 9.7 FL (ref 7.4–10.4)
POCT GLUCOSE: 110 MG/DL (ref 70–110)
POTASSIUM SERPL-SCNC: 4.1 MMOL/L (ref 3.5–5.1)
PROT SERPL-MCNC: 4.6 GM/DL (ref 5.8–7.6)
RBC # BLD AUTO: 3.6 X10(6)/MCL (ref 4.2–5.4)
RHINOVIRUS RNA SPEC NAA+PROBE: NOT DETECTED
RSV A 5' UTR RNA NPH QL NAA+PROBE: NOT DETECTED
SODIUM SERPL-SCNC: 136 MMOL/L (ref 136–145)
VANCOMYCIN TROUGH SERPL-MCNC: 19.4 UG/ML (ref 15–20)
VANCOMYCIN TROUGH SERPL-MCNC: 44.2 UG/ML (ref 15–20)
WBC # SPEC AUTO: 6.3 X10(3)/MCL (ref 4.5–11.5)

## 2022-08-01 PROCEDURE — 92611 MOTION FLUOROSCOPY/SWALLOW: CPT

## 2022-08-01 PROCEDURE — 84100 ASSAY OF PHOSPHORUS: CPT | Performed by: NURSE PRACTITIONER

## 2022-08-01 PROCEDURE — 85025 COMPLETE CBC W/AUTO DIFF WBC: CPT | Performed by: NURSE PRACTITIONER

## 2022-08-01 PROCEDURE — 80053 COMPREHEN METABOLIC PANEL: CPT | Performed by: NURSE PRACTITIONER

## 2022-08-01 PROCEDURE — 80202 ASSAY OF VANCOMYCIN: CPT | Performed by: INTERNAL MEDICINE

## 2022-08-01 PROCEDURE — 63600175 PHARM REV CODE 636 W HCPCS: Performed by: INTERNAL MEDICINE

## 2022-08-01 PROCEDURE — 27000207 HC ISOLATION

## 2022-08-01 PROCEDURE — 36415 COLL VENOUS BLD VENIPUNCTURE: CPT | Performed by: INTERNAL MEDICINE

## 2022-08-01 PROCEDURE — 83735 ASSAY OF MAGNESIUM: CPT | Performed by: NURSE PRACTITIONER

## 2022-08-01 PROCEDURE — S0030 INJECTION, METRONIDAZOLE: HCPCS | Performed by: INTERNAL MEDICINE

## 2022-08-01 PROCEDURE — 25000003 PHARM REV CODE 250: Performed by: INTERNAL MEDICINE

## 2022-08-01 PROCEDURE — 94761 N-INVAS EAR/PLS OXIMETRY MLT: CPT

## 2022-08-01 PROCEDURE — 92526 ORAL FUNCTION THERAPY: CPT

## 2022-08-01 PROCEDURE — 36415 COLL VENOUS BLD VENIPUNCTURE: CPT | Performed by: NURSE PRACTITIONER

## 2022-08-01 PROCEDURE — A9698 NON-RAD CONTRAST MATERIALNOC: HCPCS | Performed by: INTERNAL MEDICINE

## 2022-08-01 PROCEDURE — 11000001 HC ACUTE MED/SURG PRIVATE ROOM

## 2022-08-01 PROCEDURE — 25500020 PHARM REV CODE 255: Performed by: INTERNAL MEDICINE

## 2022-08-01 PROCEDURE — A4216 STERILE WATER/SALINE, 10 ML: HCPCS | Performed by: INTERNAL MEDICINE

## 2022-08-01 RX ORDER — MAGNESIUM SULFATE HEPTAHYDRATE 40 MG/ML
2 INJECTION, SOLUTION INTRAVENOUS ONCE
Status: COMPLETED | OUTPATIENT
Start: 2022-08-01 | End: 2022-08-01

## 2022-08-01 RX ADMIN — METRONIDAZOLE 500 MG: 500 INJECTION, SOLUTION INTRAVENOUS at 12:08

## 2022-08-01 RX ADMIN — SODIUM CHLORIDE, PRESERVATIVE FREE 10 ML: 5 INJECTION INTRAVENOUS at 05:08

## 2022-08-01 RX ADMIN — Medication 400 MG: at 10:08

## 2022-08-01 RX ADMIN — SODIUM CHLORIDE, PRESERVATIVE FREE 10 ML: 5 INJECTION INTRAVENOUS at 12:08

## 2022-08-01 RX ADMIN — CIPROFLOXACIN 400 MG: 2 INJECTION, SOLUTION INTRAVENOUS at 06:08

## 2022-08-01 RX ADMIN — MAGNESIUM SULFATE HEPTAHYDRATE 2 G: 40 INJECTION, SOLUTION INTRAVENOUS at 10:08

## 2022-08-01 RX ADMIN — SODIUM CHLORIDE, POTASSIUM CHLORIDE, SODIUM LACTATE AND CALCIUM CHLORIDE: 600; 310; 30; 20 INJECTION, SOLUTION INTRAVENOUS at 08:08

## 2022-08-01 RX ADMIN — ENOXAPARIN SODIUM 30 MG: 30 INJECTION SUBCUTANEOUS at 05:08

## 2022-08-01 RX ADMIN — METRONIDAZOLE 500 MG: 500 INJECTION, SOLUTION INTRAVENOUS at 08:08

## 2022-08-01 RX ADMIN — METRONIDAZOLE 500 MG: 500 INJECTION, SOLUTION INTRAVENOUS at 05:08

## 2022-08-01 RX ADMIN — VANCOMYCIN HYDROCHLORIDE 1250 MG: 1.25 INJECTION, POWDER, LYOPHILIZED, FOR SOLUTION INTRAVENOUS at 07:08

## 2022-08-01 RX ADMIN — POTASSIUM PHOSPHATE, MONOBASIC AND POTASSIUM PHOSPHATE, DIBASIC 15 MMOL: 224; 236 INJECTION, SOLUTION, CONCENTRATE INTRAVENOUS at 12:08

## 2022-08-01 RX ADMIN — CEFTRIAXONE SODIUM 1 G: 1 INJECTION, POWDER, FOR SOLUTION INTRAMUSCULAR; INTRAVENOUS at 03:08

## 2022-08-01 RX ADMIN — BARIUM SULFATE 10 ML: 0.81 POWDER, FOR SUSPENSION ORAL at 02:08

## 2022-08-01 RX ADMIN — SODIUM CHLORIDE, PRESERVATIVE FREE 10 ML: 5 INJECTION INTRAVENOUS at 06:08

## 2022-08-01 RX ADMIN — TRAZODONE HYDROCHLORIDE 100 MG: 100 TABLET ORAL at 10:08

## 2022-08-01 NOTE — PROGRESS NOTES
[facility] - 6th Floor Medical Telemetry  Nephrology  Progress Note    Patient Name: Cleo Cornelius  MRN: [id number]  Admission Date: 7/29/2022  Hospital Length of Stay: 3 days  Attending Provider: Dotite Kuo MD   Primary Care Physician: No primary care provider on file.  Principal Problem:Weakness  Subjective:     HPI:  This is a 81-year-old female who resides at the nursing home presented to ED on 07/29/2022 with increased lethargy 24 hours prior to presentation.  She was recently diagnosed with COVID on July 5th and apparently recovered.  She was again COVID positive on admission and subsequently admitted.  Nephrology was consulted for reports of fluctuating hyponatremia.  Later determined to be lab error.  She did also have fluctuations and potassium and magnesium.  Hyponatremia was worked up regardless from Nephrology standpoint.       Review of patient's allergies indicates:   Allergen Reactions    Penicillins     Sulfa (sulfonamide antibiotics)      Current Facility-Administered Medications   Medication Frequency    acetaminophen tablet 650 mg Q8H PRN    acetaminophen tablet 650 mg Q4H PRN    atorvastatin tablet 40 mg Daily    ciprofloxacin (CIPRO)400mg/200ml D5W IVPB 400 mg Q12H    clonazePAM tablet 0.5 mg BID PRN    clopidogreL tablet 75 mg Daily    dextrose 50% injection 12.5 g PRN    dextrose 50% injection 25 g PRN    enoxaparin injection 30 mg Daily    glucagon (human recombinant) injection 1 mg PRN    glucose chewable tablet 16 g PRN    glucose chewable tablet 24 g PRN    insulin aspart U-100 injection 0-5 Units QID (AC + HS) PRN    lactated ringers infusion Continuous    magnesium oxide tablet 400 mg BID    magnesium sulfate 2g in water 50mL IVPB (premix) Once    metronidazole IVPB 500 mg Q8H    ondansetron injection 4 mg Q8H PRN    potassium phosphate 15 mmol in dextrose 5 % 250 mL infusion Once    sodium chloride 0.9% flush 10 mL Q6H    And    sodium chloride  0.9% flush 10 mL PRN    traZODone tablet 100 mg QHS    vancomycin - pharmacy to dose pharmacy to manage frequency       Objective:     Vital Signs (Most Recent):  Temp: 98.2 °F (36.8 °C) (08/01/22 0759)  Pulse: 72 (08/01/22 0759)  Resp: 18 (08/01/22 0759)  BP: 116/72 (08/01/22 0759)  SpO2: 96 % (08/01/22 0759)  O2 Device (Oxygen Therapy): room air (08/01/22 0759) Vital Signs (24h Range):  Temp:  [98.2 °F (36.8 °C)-98.8 °F (37.1 °C)] 98.2 °F (36.8 °C)  Pulse:  [64-80] 72  Resp:  [16-18] 18  SpO2:  [95 %-97 %] 96 %  BP: ()/(59-75) 116/72     Weight: 81.6 kg (180 lb) (07/29/22 1014)  Body mass index is 32.92 kg/m².  Body surface area is 1.89 meters squared.    I/O last 3 completed shifts:  In: -   Out: 800 [Urine:800]    Physical Exam  Vitals (Direct physical examination deferred secondary to positive COVID 19 status.  Physical exam completed with the help of the nurse.) reviewed.     Patient is awake alert oriented on room air.  She does have some lower extremity dependent edema non severe.  Urinating via PureWick.    Significant Labs:sure  BMP:   Recent Labs   Lab 08/01/22  0452      K 4.1   CO2 22*   BUN 5.0*   CREATININE 0.79   CALCIUM 7.5*   MG 1.40*     CBC:   Recent Labs   Lab 08/01/22 0452   WBC 6.3   RBC 3.60*   HGB 10.9*   HCT 34.1*      MCV 94.7*   MCH 30.3   MCHC 32.0*     Microbiology Results (last 7 days)     Procedure Component Value Units Date/Time    Blood culture #2 **CANNOT BE ORDERED STAT** [[id number]]  (Abnormal)  (Susceptibility) Collected: 07/29/22 1406    Order Status: Completed Specimen: Blood from Arm, Right Updated: 08/01/22 0642     CULTURE, BLOOD ([facility]) Staphylococcus auricularis     GRAM STAIN Gram Positive Cocci, probable Staphylococcus      Seen in gram stain of broth only      1 of 1 Pediatric bottle positive     Blood culture #1 **CANNOT BE ORDERED STAT** [[id number]]  (Normal) Collected: 07/29/22 1406    Order Status: Completed Specimen: Blood from Hand, Right  Updated: 07/31/22 1501     CULTURE, BLOOD ([facility]) No Growth At 48 Hours    Respiratory Culture [[id number]] Collected: 07/29/22 2147    Order Status: Canceled Specimen: Sputum, Expectorated Updated: 07/29/22 2149        Recent Labs   Lab 07/29/22  1105   PROTEINUA Trace*   BACTERIA None Seen   LEUKOCYTESUR 2+*   UROBILINOGEN 0.2            Assessment/Plan:     1. Hyponatremia - - resolved.    2. Hypokalemia  3. Hypomagnesemia  4. COVID positive  5. Staph bacteremia 1 of 2 bottles 7/29  6. Anemia of CKD  7. Deconditioning  8. Acute UTI-Proteus mirabilis 7/28    --hyponatremia and hypokalemia resolved.  Magnesium has been replaced today per primary.  No further adjustments from renal standpoint  --continue lactated Ringer's at 75 cc/hour.  --renal function appears normal.  Discuss vancomycin trough with pharmacist.  He is to recheck it later this afternoon.    MILO Gold  Nephrology  [facility] - 6th Floor Medical Telemetry

## 2022-08-01 NOTE — PROGRESS NOTES
Pharmacokinetic Assessment Follow Up: IV Vancomycin    Vancomycin serum concentration assessment(s):    The trough level was drawn incorrectly and cannot be used to guide therapy at this time.@0858  The RANDOM level was drawn correctly and will be used to guide therapy at this time.@14:35  Vancomycin Regimen Plan:    Change regimen to Vancomycin 1250mg mg IV every 24 hours with next serum trough concentration measured at 1700 prior to 1800 dose on 8/2    Drug levels (last 3 results):  Recent Labs   Lab Result Units 08/01/22  0858 08/01/22  1435   Vancomycin Trough ug/ml 44.2* 19.4       Pharmacy will continue to follow and monitor vancomycin.    Please contact pharmacy at extension [phone] for questions regarding this assessment.    Thank you for the consult,   Michael Herrmann       Patient brief summary:  Cleo Cornelius is a 87 y.o. female initiated on antimicrobial therapy with IV Vancomycin for treatment of bacteremia        Drug Allergies:   Review of patient's allergies indicates:   Allergen Reactions    Penicillins     Sulfa (sulfonamide antibiotics)          Renal Function:   Estimated Creatinine Clearance: 51.5 mL/min (based on SCr of 0.79 mg/dL).,     Dialysis Method (if applicable):  N/A    CBC (last 72 hours):  Recent Labs   Lab Result Units 07/30/22  0811 07/31/22  0447 08/01/22  0452   WBC x10(3)/mcL 11.6* 7.7 6.3   Hgb gm/dL 11.5* 11.0* 10.9*   Hct % 33.4* 33.4* 34.1*   Platelet x10(3)/mcL 200 187 181   Mono % % 6.0 6.8 11.0   Eos % % 1.5 2.2 3.5   Basophil % % 0.3 0.1 0.3       Metabolic Panel (last 72 hours):  Recent Labs   Lab Result Units 07/30/22  0811 07/30/22  1112 07/30/22  1545 07/31/22  0447 08/01/22  0452   Sodium Level mmol/L 124* 113* 139 135* 136   Potassium Level mmol/L 3.3*  --  3.9 2.6* 4.1   Chloride mmol/L 97*  --  104 103 107   Carbon Dioxide mmol/L 15*  --  20* 22* 22*   Glucose Level mg/dL 108  --  119* 105 106   Blood Urea Nitrogen mg/dL 15.3  --  15.0 10.1 5.0*   Creatinine mg/dL 0.87   --  0.91 0.82 0.79   Albumin Level gm/dL 2.7*  --   --  2.1* 2.3*   Bilirubin Total mg/dL 1.1  --   --  0.5 0.5   Alkaline Phosphatase unit/L 46  --   --  47 50   Aspartate Aminotransferase unit/L 27  --   --  17 21   Alanine Aminotransferase unit/L 63*  --   --  47 44   Magnesium Level mg/dL 1.20*  --   --   --  1.40*   Phosphorus Level mg/dL 1.8*  --   --   --  1.8*       Vancomycin Administrations:  vancomycin given in the last 96 hours                     vancomycin 1.5 g in dextrose 5 % 250 mL IVPB (ready to mix) (mg) 1,500 mg New Bag 07/31/22 1727    vancomycin (VANCOCIN) 1,750 mg in dextrose 5 % 500 mL IVPB (mg) 1,750 mg New Bag 07/30/22 2302                    Microbiologic Results:  Microbiology Results (last 7 days)       Procedure Component Value Units Date/Time    Blood culture #1 **CANNOT BE ORDERED STAT** [[id number]]  (Normal) Collected: 07/29/22 1406    Order Status: Completed Specimen: Blood from Hand, Right Updated: 08/01/22 1503     CULTURE, BLOOD ([facility]) No Growth At 72 Hours    Blood culture #2 **CANNOT BE ORDERED STAT** [[id number]]  (Abnormal)  (Susceptibility) Collected: 07/29/22 1406    Order Status: Completed Specimen: Blood from Arm, Right Updated: 08/01/22 0642     CULTURE, BLOOD ([facility]) Staphylococcus auricularis     GRAM STAIN Gram Positive Cocci, probable Staphylococcus      Seen in gram stain of broth only      1 of 1 Pediatric bottle positive     Respiratory Culture [[id number]] Collected: 07/29/22 2147    Order Status: Canceled Specimen: Sputum, Expectorated Updated: 07/29/22 2149

## 2022-08-01 NOTE — PROGRESS NOTES
[facility] Hospital Medicine Progress Note        Chief Complaint: Inpatient Follow-up for     HPI: 87 y.o. 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 due to increased lethargy x1 day. Patient was COVID positive on 7/5/22. She had labs done at the NH and was given IV fluids for dehydration. She is not the best historian.     Initial ED VS include BP 95/50, HR 65, RR 18, SpO2 100%, temp 96.3F. Labs notable for WBC 16.1, potassium 3.4, chloride 115, CO2 13, glucose 78, magnesium 1.4, COVID positive. Blood cultures x2 pending. CXR revealed a left sided pleural effusion, also concerning for pneumonia. She was started on IV Levaquin. There was mention of her possibly needing a PEG tube, which family members are undecided about    Interval Hx:   Patient seen and examined this morning with patient's daughter bedside sleeping woke up to verbal stimuli updated patient's current status to patient's daughter  Objective/physical exam:  General: In no acute distress, afebrile  Chest: Clear to auscultation bilaterally  Heart: RRR, +S1, S2, no appreciable murmur  Abdomen: Soft, nontender, BS +  MSK: Warm, no lower extremity edema, no clubbing or cyanosis  Neurologic: Alert and oriented     VITAL SIGNS: 24 HRS MIN & MAX LAST   Temp  Min: 98.2 °F (36.8 °C)  Max: 98.8 °F (37.1 °C) 98.6 °F (37 °C)   BP  Min: 98/63  Max: 116/72 110/70   Pulse  Min: 72  Max: 83  83   Resp  Min: 18  Max: 18 18   SpO2  Min: 92 %  Max: 97 % (!) 92 %       Recent Labs   Lab 07/30/22  0811 07/31/22  0447 08/01/22  0452   WBC 11.6* 7.7 6.3   RBC 3.50* 3.59* 3.60*   HGB 11.5* 11.0* 10.9*   HCT 33.4* 33.4* 34.1*   MCV 95.4* 93.0 94.7*   MCH 32.9* 30.6 30.3   MCHC 34.4 32.9* 32.0*   RDW 13.4 13.3 13.9    187 181   MPV 11.2* 9.3  9.7       Recent Labs   Lab 07/29/22  1402 07/29/22  2115 07/30/22  0811 07/30/22  1112 07/30/22  1247 07/30/22  1545 07/31/22  0447 08/01/22  0452     --  124*   < >  --  139 135* 136   K 3.4*  --  3.3*  --   --  3.9 2.6* 4.1   CO2 13*  --  15*  --   --  20* 22* 22*   BUN 18.6  --  15.3  --   --  15.0 10.1 5.0*   CREATININE 0.89  --  0.87  --   --  0.91 0.82 0.79   CALCIUM 7.3*  --  8.3*  --   --  8.1* 7.5* 7.5*   PH  --  7.38  --   --  7.58*  --   --   --    MG 1.40*  --  1.20*  --   --   --   --  1.40*   ALBUMIN 2.5*  --  2.7*  --   --   --  2.1* 2.3*   ALKPHOS 52  --  46  --   --   --  47 50   ALT 71*  --  63*  --   --   --  47 44   AST 22  --  27  --   --   --  17 21   BILITOT 0.5  --  1.1  --   --   --  0.5 0.5    < > = values in this interval not displayed.          Microbiology Results (last 7 days)     Procedure Component Value Units Date/Time    Blood culture #2 **CANNOT BE ORDERED STAT** [[id number]]  (Abnormal)  (Susceptibility) Collected: 07/29/22 1406    Order Status: Completed Specimen: Blood from Arm, Right Updated: 08/01/22 0642     CULTURE, BLOOD ([facility]) Staphylococcus auricularis     GRAM STAIN Gram Positive Cocci, probable Staphylococcus      Seen in gram stain of broth only      1 of 1 Pediatric bottle positive     Blood culture #1 **CANNOT BE ORDERED STAT** [[id number]]  (Normal) Collected: 07/29/22 1406    Order Status: Completed Specimen: Blood from Hand, Right Updated: 07/31/22 1501     CULTURE, BLOOD ([facility]) No Growth At 48 Hours    Respiratory Culture [[id number]] Collected: 07/29/22 2147    Order Status: Canceled Specimen: Sputum, Expectorated Updated: 07/29/22 2149           See below for Radiology    Scheduled Med:   atorvastatin  40 mg Oral Daily    ciprofloxacin  400 mg Intravenous Q12H    clopidogreL  75 mg Oral Daily    enoxaparin  30 mg Subcutaneous Daily    magnesium oxide  400 mg Oral BID    metronidazole  500 mg Intravenous Q8H    potassium phosphate IVPB  15 mmol  Intravenous Once    sodium chloride 0.9%  10 mL Intravenous Q6H    traZODone  100 mg Oral QHS        Continuous Infusions:   lactated ringers 75 mL/hr at 08/01/22 0845        PRN Meds:  acetaminophen, acetaminophen, clonazePAM, dextrose 50%, dextrose 50%, glucagon (human recombinant), glucose, glucose, insulin aspart U-100, ondansetron, Flushing PICC Protocol **AND** sodium chloride 0.9% **AND** sodium chloride 0.9%, vancomycin - pharmacy to dose       Assessment/Plan:  Sepsis  Lactic acidosis  COVID-19 positive  UTI present on admission with Proteus  Blood cultures positive for Staph most likely contaminant  Metabolic acidosis  Hypokalemia  Hypomagnesemia and hypophosphatemia  Left-sided pleural effusion  Diabetes mellitus type 2  History of anxiety  Peripheral vascular disease  Major depression, insomnia, hypertension, hyperlipidemia, history of coronary artery disease  History of CVA with left-sided hemiplegia  Constipation  Psoriasis    We will switch antibiotics to Rocephin.  The patient is allergic to penicillin but a low bed previous notes from 2018 when she was admitted to ICU for septic shock at that time she was on Rocephin and was discharged home on cefdinir discussed with patient's daughter  Rocephin 1 g Q 24 hours as per the sensitivities  I will discontinue vancomycin as blood culture is growing Staph auricularis most likely a contaminant  Continue with Rocephin and Flagyl for proctitis  Phosphorus was no will be given Neutra-Phos and will be given 2 g of Mag sulfate  Continue with LR  Speech consulted  Venous ultrasound was negative for DVT    VTE prophylaxis:  Lovenox    Patient condition:  Stable/Fair/Guarded/ Serious/ Critical    Anticipated discharge and Disposition:         All diagnosis and differential diagnosis have been reviewed; assessment and plan has been documented; I have personally reviewed the labs and test results that are presently available; I have reviewed the patients  medication list; I have reviewed the consulting providers response and recommendations. I have reviewed or attempted to review medical records based upon their availability    All of the patient's questions have been  addressed and answered. Patient's is agreeable to the above stated plan. I will continue to monitor closely and make adjustments to medical management as needed.  _____________________________________________________________________    Nutrition Status:    Radiology:  X-Ray Chest 1 View  Narrative: EXAMINATION:  XR CHEST 1 VIEW    CPT 11129    CLINICAL HISTORY:  PICC Placement;    COMPARISON:  July 29, 2022    FINDINGS:  Cardiomediastinal silhouette improved parenchymal changes to be essentially unchanged as compared with the previous exam.    Interval insertion of right-sided PICC line tip projecting at the junction of the superior vena cava and right atrium  Impression: No significant change.    PICC line insertion    Electronically signed by: Benjamin Chin  Date:    07/31/2022  Time:    08:57      Tamika Abreu MD   08/01/2022

## 2022-08-01 NOTE — PROGRESS NOTES
Pharmacokinetic Assessment Follow Up: IV Vancomycin    Vancomycin serum concentration assessment(s):    The trough level was drawn incorrectly and cannot be used to guide therapy at this time.@0858  The RANDOM level was drawn correctly and will be used to guide therapy at this time.@14:35  Vancomycin Regimen Plan:    Change regimen to Vancomycin 1250mg mg IV every 24 hours with next serum trough concentration measured at 1700 prior to 1800 dose on 8/2    Drug levels (last 3 results):  Recent Labs   Lab Result Units 08/01/22  0858 08/01/22  1435   Vancomycin Trough ug/ml 44.2* 19.4       Pharmacy will continue to follow and monitor vancomycin.    Please contact pharmacy at extension [phone] for questions regarding this assessment.    Thank you for the consult,   Michael Herrmann       Patient brief summary:  Cleo Cornelius is a 87 y.o. female initiated on antimicrobial therapy with IV Vancomycin for treatment of     Drug Allergies:   Review of patient's allergies indicates:   Allergen Reactions    Penicillins     Sulfa (sulfonamide antibiotics)          Renal Function:   Estimated Creatinine Clearance: 51.5 mL/min (based on SCr of 0.79 mg/dL).,     Dialysis Method (if applicable):  N/A    CBC (last 72 hours):  Recent Labs   Lab Result Units 07/30/22  0811 07/31/22  0447 08/01/22  0452   WBC x10(3)/mcL 11.6* 7.7 6.3   Hgb gm/dL 11.5* 11.0* 10.9*   Hct % 33.4* 33.4* 34.1*   Platelet x10(3)/mcL 200 187 181   Mono % % 6.0 6.8 11.0   Eos % % 1.5 2.2 3.5   Basophil % % 0.3 0.1 0.3       Metabolic Panel (last 72 hours):  Recent Labs   Lab Result Units 07/30/22  0811 07/30/22  1112 07/30/22  1545 07/31/22  0447 08/01/22  0452   Sodium Level mmol/L 124* 113* 139 135* 136   Potassium Level mmol/L 3.3*  --  3.9 2.6* 4.1   Chloride mmol/L 97*  --  104 103 107   Carbon Dioxide mmol/L 15*  --  20* 22* 22*   Glucose Level mg/dL 108  --  119* 105 106   Blood Urea Nitrogen mg/dL 15.3  --  15.0 10.1 5.0*   Creatinine mg/dL 0.87  --  0.91  0.82 0.79   Albumin Level gm/dL 2.7*  --   --  2.1* 2.3*   Bilirubin Total mg/dL 1.1  --   --  0.5 0.5   Alkaline Phosphatase unit/L 46  --   --  47 50   Aspartate Aminotransferase unit/L 27  --   --  17 21   Alanine Aminotransferase unit/L 63*  --   --  47 44   Magnesium Level mg/dL 1.20*  --   --   --  1.40*   Phosphorus Level mg/dL 1.8*  --   --   --  1.8*       Vancomycin Administrations:  vancomycin given in the last 96 hours                     vancomycin 1.5 g in dextrose 5 % 250 mL IVPB (ready to mix) (mg) 1,500 mg New Bag 07/31/22 1727    vancomycin (VANCOCIN) 1,750 mg in dextrose 5 % 500 mL IVPB (mg) 1,750 mg New Bag 07/30/22 2302                    Microbiologic Results:  Microbiology Results (last 7 days)       Procedure Component Value Units Date/Time    Blood culture #1 **CANNOT BE ORDERED STAT** [[id number]]  (Normal) Collected: 07/29/22 1406    Order Status: Completed Specimen: Blood from Hand, Right Updated: 08/01/22 1503     CULTURE, BLOOD ([facility]) No Growth At 72 Hours    Blood culture #2 **CANNOT BE ORDERED STAT** [[id number]]  (Abnormal)  (Susceptibility) Collected: 07/29/22 1406    Order Status: Completed Specimen: Blood from Arm, Right Updated: 08/01/22 0642     CULTURE, BLOOD ([facility]) Staphylococcus auricularis     GRAM STAIN Gram Positive Cocci, probable Staphylococcus      Seen in gram stain of broth only      1 of 1 Pediatric bottle positive     Respiratory Culture [[id number]] Collected: 07/29/22 2147    Order Status: Canceled Specimen: Sputum, Expectorated Updated: 07/29/22 2149

## 2022-08-01 NOTE — PROCEDURES
"Speech Language Pathology Department  Modified Barium Swallow Study    Patient Name:  Cleo Cornelius   MRN:  [id number]  Diagnosis: Weakness     Recommendations:     General Recommendations:  SLP to f/u x1 regarding diet tolerance  Repeat MBS study: n/a  Diet recommendations:  Regular Diet - IDDSI Level 7, Liquid Diet Level: Thin liquids - IDDSI Level 0   Swallow Strategies/Precautions: small bites/sips and medications per patient preference  General Precautions: Standard, aspiration    History:     A Modified Barium Swallow Study was completed to assess the efficiency of his/her swallow function, rule out aspiration and make recommendations regarding safe dietary consistencies, effective compensatory strategies, and safe eating environment.     History reviewed. No pertinent past medical history.      Current Method of Nutrition: NPO    Patient complaint: "Can you leave me alone?"    Subjective:     Patient: resting, arousable with verbal/tactile stimulation and requiring encouragement for participation.        Fluoroscopic Results:     Oral Musculature Evaluation:   Oral Musculature: general weakness   Secretion Management: adequate   Mandibular Strength and Mobility: WFL   Oral Labial Strength and Mobility: impaired coordination   Lingual Strength and Mobility: impaired strength   Buccal Strength and Mobility: WFL    Visualization  · Patient was seen in the lateral view    Oral Phase:    Reduced bolus control    Pharyngeal Phase:    Swallow delay with spill to the valleculae  Consistency Laryngeal Penetration Aspiration Residue Strategy   Mildly thick liquids via cup none none     Puree  none none     Regular solids none none     Thin liquids via cup none none     Thin liquids via straw none none              Cervical Esophageal Phase:    UES appeared to accommodate all bolus types without stasis or retrograde movement visualized        Assessment:     The pt presents with mild oropharyngeal dysphagia " characterized by reduced bolus control with prespill to the valleculae.  Despite reduced bolus control, pt able to maintain adequate airway protection with no laryngeal penetration or aspiration visualized during this study.  Skilled SLP services not warranted to tx dysphagia.    Goals:   Multidisciplinary Problems     SLP Goals        Problem: SLP    Goal Priority Disciplines Outcome   SLP Goal     SLP Ongoing, Progressing                   Plan:     Patient to be seen:    Plan of Care expires:  08/28/22  Plan of Care reviewed with:  patient , daughter not present after completion of MBS  SLP Follow-Up:  Yes      Time Tracking:     SLP Treatment Date:   08/01/22  Speech Start Time:  1345  Speech Stop Time:  1430     Speech Total Time (min):  45 min    Billable minutes: Motion Fluoroscopic Evaluation, Video Recording, 45 minutes       08/01/2022

## 2022-08-01 NOTE — PT/OT/SLP PROGRESS
"Speech Language Pathology Department  Dysphagia Therapy    Patient Name:  Cleo Cornelius   MRN:  [id number]  Admitting Diagnosis: Weakness    Recommendations:     General Recommendations:  Modified Barium Swallow Study  Diet recommendations:  NPO, Liquid Diet Level: NPO   Aspiration Precautions: medications crushed in puree    Discharge recommendations:  nursing facility, skilled   Barriers to Discharge:  acuity of illness    Subjective     Patient resting, eyes closed, required increased verbal cues to participate    Patient goals: "If I do what you ask, will you leave me alone?"     Pain/Comfort:  · Pain Rating 1: 0/10        Objective:     Oral Musculature Evaluation:   Oral Musculature: general weakness   Secretion Management: adequate   Mandibular Strength and Mobility: WFL   Oral Labial Strength and Mobility: impaired coordination   Lingual Strength and Mobility: impaired strength   Buccal Strength and Mobility: WFL    Consistencies Given:  Puree  Ice chips  Thin liquids via cup    Impaired bolus reception (munching pattern and closing mouth prior to bolus entering mouth)  Cough immediately after the swallow of thin liquids    Assessment:     Pt presents with signs/sx of aspiration noted at bedside.  Discussed results with pt's daughter present at bedside who reports, "She's fine, I've been giving her water."  SLP discussed possible oropharyngeal dysphagia which may result in respiratory decline if unattended.  Discussed completing MBS for further assessment, daughter agreeable.  SLP rec: NPO, ok for meds crushed in pudding.  Will proceed with MBS to assess current swallow.    Goals:   Multidisciplinary Problems     SLP Goals        Problem: SLP    Goal Priority Disciplines Outcome   SLP Goal     SLP Ongoing, Progressing                   Plan:     Will complete MBS to assess current swallow function.    Patient to be seen:  daily   Plan of Care expires:  08/28/22  Plan of Care reviewed with:  patient, " daughter   SLP Follow-Up:  Yes       Time Tracking:     SLP Treatment Date:   08/01/22  Speech Start Time:  1100  Speech Stop Time:  1120     Speech Total Time (min):  20  Billable minutes:  Treatment of Swallow Dysfunction, 20 minutes       08/01/2022

## 2022-08-01 NOTE — CONSULTS
[facility] 6th Floor Medical Telemetry  Adult Nutrition  Consult Note    SUMMARY     Recommendations    1. Continue to replete K+, Phos, Mg as medically feasible     2. Pt NPO; Clinimix held. When medically feasible resume PPN.     3 .Oral diet per SLP when appropriate, When advanced, rec'd small portions      4. When/if unable to advance diet would rec'd EN once electrolytes replenished and/or when medically feasible. D/C Clinimix once EN initiated.   Would rec'd:   Peptamen AF @ 15 mL/hr, Increase by 10 mL every 24 hrs (pending labs)  to goal rate of 65 mL/hr.     5. Monitor closely for refeeding.       Goals: Pt will meet >/=75% EEN/EPN by f/u  Nutrition Goal Status: new    Assessment and Plan    8/1: Pt NPO per SLP. Awaiting MBS. Clinimix started, but held d/t hyponatremia yesterday. Per MD, holding today as well. Noted altered electrolytes (phos, mg, K+) - being replaced. Per NP note, pt not eating anything for last x 2 wks. At risk for refeeding. If SLP is unable to advance diet, would rec'd EN as pt able to use gut.       Malnutrition Assessment    Malnutrition in the context of acute illness or injury  Degree of Malnutrition: N/A  Energy Intake:  </= 50% of estimated energy requirement for >/= 5 days  Interpretation of Weight Loss:  unable to obtain  Body Fat: unable to obtain  Area of Body Fat Loss:  unable to obtain  Muscle Mass Loss:  Unable to obtain   Area of Muscle Mass Loss: Unable to obtain   Fluid Accumulation:  unable to obtain  Edema:  unable to obtain   Reduced  Strength:  unable to obtain    A minimum of two characteristics is recommended for diagnosis of either severe or non-severe malnutrition.        Reason for Assessment    Diagnosis:  (Sepsis, Lactic acidosis, COVID-19+, UTI, Metabolic acidosis,Hyponatremia, Hypokalemia  Hypomagnesemia, hypophosphatemia, Left-sided pleural effusion ,DM2)  Relevant Medical History: anxiety  Peripheral vascular disease  Major  "depression, insomnia, , hyperlipidemia, history of coronary artery disease  History of CVA with left-sided hemiplegia  Constipation  Psoriasis    Nutrition Risk Screen    Nutrition Risk Screen: other (see comments) (Not eating; loss of appetite)    Nutrition/Diet History    Spiritual, Cultural Beliefs, Religion Practices, Values that Affect Care: no    Anthropometrics    Temp: 98.2 °F (36.8 °C)  Height Method: Estimated  Height: 5' 2" (157.5 cm)  Height (inches): 62 in  Weight Method: Estimated  Weight: 81.6 kg (180 lb)  Weight (lb): 180 lb  Ideal Body Weight (IBW), Female: 110 lb  % Ideal Body Weight, Female (lb): 163.64 %  BMI (Calculated): 32.9    Wt Readings from Last 5 Encounters:   07/29/22 81.6 kg (180 lb)     Unable to obtain UBW at this time.     Lab/Procedures/Meds    Pertinent Labs Comments: Mg 1.4(L), Phos 1.8(L), Ca 7.5(L), Alb 2.3(L), GFR >60, H/H 10.9/34.1(L)  Pertinent Medications Comments: statin, mg-ox, LR@75 mL/hr, Vanco, IVPB KCl, SSI, statin,    Estimated/Assessed Needs    Weight Used For Calorie Calculations: 81.6 kg (179 lb 14.3 oz)  Energy Calorie Requirements (kcal): 1578 kcal/kg (MSJ x 1.3 SF)  Protein Requirements: 102 (1.25 g/kg (ST 2 PU))  Weight Used For Protein Calculations: 81.6 kg (179 lb 14.3 oz)  RDA Method (mL): 1578       Nutrition Prescription Ordered    Current Diet Order: NPO    Evaluation of Received Nutrient/Fluid Intake    % Intake of Estimated Energy Needs: Other: NPO  % Meal Intake: NPO    Nutrition Risk    Level of Risk/Frequency of Follow-up: high       Monitor and Evaluation      Wt, po intake, nutrition related labs       Nutrition Follow-Up    RD Follow-up?: Yes  "

## 2022-08-02 LAB
ANION GAP SERPL CALC-SCNC: 8 MEQ/L
BASOPHILS # BLD AUTO: 0.04 X10(3)/MCL (ref 0–0.2)
BASOPHILS NFR BLD AUTO: 0.7 %
BUN SERPL-MCNC: 3.1 MG/DL (ref 9.8–20.1)
CALCIUM SERPL-MCNC: 7.9 MG/DL (ref 8.4–10.2)
CHLORIDE SERPL-SCNC: 107 MMOL/L (ref 98–107)
CO2 SERPL-SCNC: 21 MMOL/L (ref 23–31)
CREAT SERPL-MCNC: 0.73 MG/DL (ref 0.55–1.02)
CREAT/UREA NIT SERPL: 4
EOSINOPHIL # BLD AUTO: 0.23 X10(3)/MCL (ref 0–0.9)
EOSINOPHIL NFR BLD AUTO: 4.3 %
ERYTHROCYTE [DISTWIDTH] IN BLOOD BY AUTOMATED COUNT: 13.6 % (ref 11.5–17)
GLUCOSE SERPL-MCNC: 122 MG/DL (ref 82–115)
HCT VFR BLD AUTO: 35.8 % (ref 37–47)
HGB BLD-MCNC: 11.3 GM/DL (ref 12–16)
IMM GRANULOCYTES # BLD AUTO: 0.07 X10(3)/MCL (ref 0–0.04)
IMM GRANULOCYTES NFR BLD AUTO: 1.3 %
LYMPHOCYTES # BLD AUTO: 1.69 X10(3)/MCL (ref 0.6–4.6)
LYMPHOCYTES NFR BLD AUTO: 31.4 %
MCH RBC QN AUTO: 29.7 PG (ref 27–31)
MCHC RBC AUTO-ENTMCNC: 31.6 MG/DL (ref 33–36)
MCV RBC AUTO: 94.2 FL (ref 80–94)
MONOCYTES # BLD AUTO: 0.62 X10(3)/MCL (ref 0.1–1.3)
MONOCYTES NFR BLD AUTO: 11.5 %
NEUTROPHILS # BLD AUTO: 2.7 X10(3)/MCL (ref 2.1–9.2)
NEUTROPHILS NFR BLD AUTO: 50.8 %
NRBC BLD AUTO-RTO: 0 %
PLATELET # BLD AUTO: 161 X10(3)/MCL (ref 130–400)
PMV BLD AUTO: 9.2 FL (ref 7.4–10.4)
POCT GLUCOSE: 224 MG/DL (ref 70–110)
POTASSIUM SERPL-SCNC: 3.5 MMOL/L (ref 3.5–5.1)
RBC # BLD AUTO: 3.8 X10(6)/MCL (ref 4.2–5.4)
SODIUM SERPL-SCNC: 136 MMOL/L (ref 136–145)
WBC # SPEC AUTO: 5.4 X10(3)/MCL (ref 4.5–11.5)

## 2022-08-02 PROCEDURE — 85025 COMPLETE CBC W/AUTO DIFF WBC: CPT | Performed by: INTERNAL MEDICINE

## 2022-08-02 PROCEDURE — 80048 BASIC METABOLIC PNL TOTAL CA: CPT | Performed by: NURSE PRACTITIONER

## 2022-08-02 PROCEDURE — 25000003 PHARM REV CODE 250: Performed by: INTERNAL MEDICINE

## 2022-08-02 PROCEDURE — 27000207 HC ISOLATION

## 2022-08-02 PROCEDURE — S0030 INJECTION, METRONIDAZOLE: HCPCS | Performed by: INTERNAL MEDICINE

## 2022-08-02 PROCEDURE — 36415 COLL VENOUS BLD VENIPUNCTURE: CPT | Performed by: INTERNAL MEDICINE

## 2022-08-02 PROCEDURE — 11000001 HC ACUTE MED/SURG PRIVATE ROOM

## 2022-08-02 PROCEDURE — A4216 STERILE WATER/SALINE, 10 ML: HCPCS | Performed by: INTERNAL MEDICINE

## 2022-08-02 PROCEDURE — 63600175 PHARM REV CODE 636 W HCPCS: Performed by: INTERNAL MEDICINE

## 2022-08-02 RX ADMIN — METRONIDAZOLE 500 MG: 500 INJECTION, SOLUTION INTRAVENOUS at 09:08

## 2022-08-02 RX ADMIN — METRONIDAZOLE 500 MG: 500 INJECTION, SOLUTION INTRAVENOUS at 02:08

## 2022-08-02 RX ADMIN — METRONIDAZOLE 500 MG: 500 INJECTION, SOLUTION INTRAVENOUS at 05:08

## 2022-08-02 RX ADMIN — SODIUM CHLORIDE, PRESERVATIVE FREE 10 ML: 5 INJECTION INTRAVENOUS at 06:08

## 2022-08-02 RX ADMIN — SODIUM CHLORIDE, PRESERVATIVE FREE 10 ML: 5 INJECTION INTRAVENOUS at 02:08

## 2022-08-02 RX ADMIN — Medication 400 MG: at 09:08

## 2022-08-02 RX ADMIN — Medication 400 MG: at 08:08

## 2022-08-02 RX ADMIN — SODIUM CHLORIDE, PRESERVATIVE FREE 10 ML: 5 INJECTION INTRAVENOUS at 05:08

## 2022-08-02 RX ADMIN — SODIUM CHLORIDE, PRESERVATIVE FREE 10 ML: 5 INJECTION INTRAVENOUS at 12:08

## 2022-08-02 RX ADMIN — ATORVASTATIN CALCIUM 40 MG: 40 TABLET, FILM COATED ORAL at 09:08

## 2022-08-02 RX ADMIN — CEFTRIAXONE SODIUM 1 G: 1 INJECTION, POWDER, FOR SOLUTION INTRAMUSCULAR; INTRAVENOUS at 05:08

## 2022-08-02 RX ADMIN — TRAZODONE HYDROCHLORIDE 100 MG: 100 TABLET ORAL at 08:08

## 2022-08-02 RX ADMIN — CLOPIDOGREL 75 MG: 75 TABLET, FILM COATED ORAL at 09:08

## 2022-08-02 RX ADMIN — ENOXAPARIN SODIUM 30 MG: 30 INJECTION SUBCUTANEOUS at 05:08

## 2022-08-02 NOTE — PROGRESS NOTES
[facility] - 6th Floor Medical Telemetry  Nephrology  Progress Note    Patient Name: Cleo Cornelius  MRN: [id number]  Admission Date: 7/29/2022  Hospital Length of Stay: 4 days  Attending Provider: Dottie Kuo MD   Primary Care Physician: No primary care provider on file.  Principal Problem:Weakness  Subjective:     HPI:  This is a 81-year-old female who resides at the nursing home presented to ED on 07/29/2022 with increased lethargy 24 hours prior to presentation.  She was recently diagnosed with COVID on July 5th and apparently recovered.  She was again COVID positive on admission and subsequently admitted.  Nephrology was consulted for reports of fluctuating hyponatremia.  Later determined to be lab error.  She did also have fluctuations and potassium and magnesium.  Hyponatremia was worked up regardless from Nephrology standpoint.     8/2: Renal function stable as well as electrolytes. No acute change overnight.   Review of patient's allergies indicates:   Allergen Reactions    Penicillins     Sulfa (sulfonamide antibiotics)      Current Facility-Administered Medications   Medication Frequency    acetaminophen tablet 650 mg Q8H PRN    acetaminophen tablet 650 mg Q4H PRN    atorvastatin tablet 40 mg Daily    cefTRIAXone (ROCEPHIN) 1 g in dextrose 5 % in water (D5W) 5 % 50 mL IVPB (MB+) Q24H    clonazePAM tablet 0.5 mg BID PRN    clopidogreL tablet 75 mg Daily    dextrose 50% injection 12.5 g PRN    dextrose 50% injection 25 g PRN    enoxaparin injection 30 mg Daily    glucagon (human recombinant) injection 1 mg PRN    glucose chewable tablet 16 g PRN    glucose chewable tablet 24 g PRN    insulin aspart U-100 injection 0-5 Units QID (AC + HS) PRN    lactated ringers infusion Continuous    magnesium oxide tablet 400 mg BID    metronidazole IVPB 500 mg Q8H    ondansetron injection 4 mg Q8H PRN    sodium chloride 0.9% flush 10 mL Q6H    And    sodium chloride 0.9% flush 10 mL  PRN    traZODone tablet 100 mg QHS    vancomycin - pharmacy to dose pharmacy to manage frequency    vancomycin 1.25 g in dextrose 5% 250 mL IVPB (ready to mix) Q24H       Objective:     Vital Signs (Most Recent):  Temp: 98.3 °F (36.8 °C) (08/02/22 0300)  Pulse: 83 (08/02/22 0300)  Resp: 20 (08/02/22 0300)  BP: 113/73 (08/02/22 0300)  SpO2: 98 % (08/02/22 0300)  O2 Device (Oxygen Therapy): room air (08/02/22 0300) Vital Signs (24h Range):  Temp:  [97.7 °F (36.5 °C)-98.6 °F (37 °C)] 98.3 °F (36.8 °C)  Pulse:  [70-83] 83  Resp:  [18-20] 20  SpO2:  [91 %-99 %] 98 %  BP: (110-131)/(65-90) 113/73     Weight: 81.6 kg (180 lb) (07/29/22 1014)  Body mass index is 32.92 kg/m².  Body surface area is 1.89 meters squared.    I/O last 3 completed shifts:  In: 0   Out: 2550 [Urine:2550]    Physical Exam  Vitals (Direct physical examination deferred secondary to positive COVID 19 status.  Physical exam completed with the help of the nurse.) reviewed.     Patient is awake alert oriented on room air.     Significant Labs:sure  BMP:   Recent Labs   Lab 08/01/22  0452 08/02/22  0459    136   K 4.1 3.5   CO2 22* 21*   BUN 5.0* 3.1*   CREATININE 0.79 0.73   CALCIUM 7.5* 7.9*   MG 1.40*  --      CBC:   Recent Labs   Lab 08/02/22  0459   WBC 5.4   RBC 3.80*   HGB 11.3*   HCT 35.8*      MCV 94.2*   MCH 29.7   MCHC 31.6*     Microbiology Results (last 7 days)     Procedure Component Value Units Date/Time    Blood culture #1 **CANNOT BE ORDERED STAT** [[id number]]  (Normal) Collected: 07/29/22 1406    Order Status: Completed Specimen: Blood from Hand, Right Updated: 08/01/22 1503     CULTURE, BLOOD ([facility]) No Growth At 72 Hours    Blood culture #2 **CANNOT BE ORDERED STAT** [[id number]]  (Abnormal)  (Susceptibility) Collected: 07/29/22 1406    Order Status: Completed Specimen: Blood from Arm, Right Updated: 08/01/22 0642     CULTURE, BLOOD ([facility]) Staphylococcus auricularis     GRAM STAIN Gram Positive Cocci, probable Staphylococcus       Seen in gram stain of broth only      1 of 1 Pediatric bottle positive     Respiratory Culture [[id number]] Collected: 07/29/22 2147    Order Status: Canceled Specimen: Sputum, Expectorated Updated: 07/29/22 2149        Recent Labs   Lab 07/29/22  1105   PROTEINUA Trace*   BACTERIA None Seen   LEUKOCYTESUR 2+*   UROBILINOGEN 0.2            Assessment/Plan:     1. Hyponatremia - - resolved.    2. Hypokalemia  3. Hypomagnesemia  4. COVID positive  5. Staph bacteremia 1 of 2 bottles 7/29  6. Anemia of CKD  7. Deconditioning  8. Acute UTI-Proteus mirabilis 7/28    --Renal function and electrolytes stable. No further recommendations from renal standpoint. We will sign off.     MILO Gold  Nephrology  [facility] - 6th Floor Medical Telemetry

## 2022-08-02 NOTE — PLAN OF CARE
Problem: Infection  Goal: Absence of Infection Signs and Symptoms  Outcome: Ongoing, Not Progressing     Problem: Adult Inpatient Plan of Care  Goal: Plan of Care Review  Outcome: Ongoing, Not Progressing  Goal: Patient-Specific Goal (Individualized)  Outcome: Ongoing, Not Progressing  Goal: Absence of Hospital-Acquired Illness or Injury  Outcome: Ongoing, Not Progressing  Goal: Optimal Comfort and Wellbeing  Outcome: Ongoing, Not Progressing  Goal: Readiness for Transition of Care  Outcome: Ongoing, Not Progressing     Problem: Skin Injury Risk Increased  Goal: Skin Health and Integrity  Outcome: Ongoing, Not Progressing     Problem: Fall Injury Risk  Goal: Absence of Fall and Fall-Related Injury  Outcome: Ongoing, Not Progressing     Problem: Impaired Wound Healing  Goal: Optimal Wound Healing  Outcome: Ongoing, Not Progressing

## 2022-08-02 NOTE — PLAN OF CARE
08/02/22 1529   Discharge Assessment   Assessment Type Discharge Planning Assessment   Source of Information health record   Lives With facility resident   Facility Arrived From: [facility]   Discharge Plan A Return to nursing home   Discharge Plan B Return to Nursing Home

## 2022-08-02 NOTE — PROGRESS NOTES
[facility]  Hospital Medicine Progress Note        Chief Complaint: Inpatient Follow-up for     HPI: 87 y.o. 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 due to increased lethargy x1 day. Patient was COVID positive on 7/5/22. She had labs done at the NH and was given IV fluids for dehydration. She is not the best historian.     Initial ED VS include BP 95/50, HR 65, RR 18, SpO2 100%, temp 96.3F. Labs notable for WBC 16.1, potassium 3.4, chloride 115, CO2 13, glucose 78, magnesium 1.4, COVID positive. Blood cultures x2 pending. CXR revealed a left sided pleural effusion, also concerning for pneumonia. She was started on IV Levaquin. There was mention of her possibly needing a PEG tube, which family members are undecided about    Interval Hx:   Patient seen and examined this morning with patient's daughter bedside   speech has cleared the patient yesterday for bad but daughter reported patient is not eating much I tried talking before but she did  not want to talk to me   Objective/physical exam:  General: In no acute distress, afebrile  Chest: Clear to auscultation bilaterally  Heart: RRR, +S1, S2, no appreciable murmur  Abdomen: Soft, nontender, BS +  MSK: Warm, no lower extremity edema, no clubbing or cyanosis  Neurologic: Alert and awake    VITAL SIGNS: 24 HRS MIN & MAX LAST   Temp  Min: 97.7 °F (36.5 °C)  Max: 98.8 °F (37.1 °C) 98.8 °F (37.1 °C)   BP  Min: 109/65  Max: 131/90 109/65   Pulse  Min: 70  Max: 87  87   Resp  Min: 18  Max: 20 18   SpO2  Min: 91 %  Max: 99 % 97 %       Recent Labs   Lab 07/31/22  0447 08/01/22  0452 08/02/22  0459   WBC 7.7 6.3 5.4   RBC 3.59* 3.60* 3.80*   HGB 11.0* 10.9* 11.3*   HCT 33.4* 34.1* 35.8*   MCV 93.0 94.7* 94.2*   MCH 30.6 30.3 29.7   MCHC 32.9* 32.0*  31.6*   RDW 13.3 13.9 13.6    181 161   MPV 9.3 9.7 9.2       Recent Labs   Lab 07/29/22  1402 07/29/22  2115 07/30/22  0811 07/30/22  1112 07/30/22  1247 07/30/22  1545 07/31/22  0447 08/01/22  0452 08/02/22  0459     --  124*   < >  --    < > 135* 136 136   K 3.4*  --  3.3*  --   --    < > 2.6* 4.1 3.5   CO2 13*  --  15*  --   --    < > 22* 22* 21*   BUN 18.6  --  15.3  --   --    < > 10.1 5.0* 3.1*   CREATININE 0.89  --  0.87  --   --    < > 0.82 0.79 0.73   CALCIUM 7.3*  --  8.3*  --   --    < > 7.5* 7.5* 7.9*   PH  --  7.38  --   --  7.58*  --   --   --   --    MG 1.40*  --  1.20*  --   --   --   --  1.40*  --    ALBUMIN 2.5*  --  2.7*  --   --   --  2.1* 2.3*  --    ALKPHOS 52  --  46  --   --   --  47 50  --    ALT 71*  --  63*  --   --   --  47 44  --    AST 22  --  27  --   --   --  17 21  --    BILITOT 0.5  --  1.1  --   --   --  0.5 0.5  --     < > = values in this interval not displayed.          Microbiology Results (last 7 days)     Procedure Component Value Units Date/Time    Blood culture #1 **CANNOT BE ORDERED STAT** [[id number]]  (Normal) Collected: 07/29/22 1406    Order Status: Completed Specimen: Blood from Hand, Right Updated: 08/01/22 1503     CULTURE, BLOOD ([facility]) No Growth At 72 Hours    Blood culture #2 **CANNOT BE ORDERED STAT** [[id number]]  (Abnormal)  (Susceptibility) Collected: 07/29/22 1406    Order Status: Completed Specimen: Blood from Arm, Right Updated: 08/01/22 0642     CULTURE, BLOOD ([facility]) Staphylococcus auricularis     GRAM STAIN Gram Positive Cocci, probable Staphylococcus      Seen in gram stain of broth only      1 of 1 Pediatric bottle positive     Respiratory Culture [[id number]] Collected: 07/29/22 2147    Order Status: Canceled Specimen: Sputum, Expectorated Updated: 07/29/22 2149           See below for Radiology    Scheduled Med:   atorvastatin  40 mg Oral Daily    cefTRIAXone (ROCEPHIN) IVPB  1 g Intravenous Q24H    clopidogreL  75 mg Oral Daily     enoxaparin  30 mg Subcutaneous Daily    magnesium oxide  400 mg Oral BID    metronidazole  500 mg Intravenous Q8H    sodium chloride 0.9%  10 mL Intravenous Q6H    traZODone  100 mg Oral QHS        Continuous Infusions:   lactated ringers 75 mL/hr at 08/01/22 0845        PRN Meds:  acetaminophen, acetaminophen, clonazePAM, dextrose 50%, dextrose 50%, glucagon (human recombinant), glucose, glucose, insulin aspart U-100, ondansetron, Flushing PICC Protocol **AND** sodium chloride 0.9% **AND** sodium chloride 0.9%       Assessment/Plan:  Sepsis  Lactic acidosis  COVID-19 positive  UTI present on admission with Proteus  Blood cultures positive for Staph most likely contaminant  Metabolic acidosis  Hypokalemia  Hypomagnesemia and hypophosphatemia  Left-sided pleural effusion  Diabetes mellitus type 2  History of anxiety  Peripheral vascular disease  Major depression, insomnia, hypertension, hyperlipidemia, history of coronary artery disease  History of CVA with left-sided hemiplegia  Constipation  Psoriasis    Continue with Rocephin tolerating it well  We discontinued vancomycin because blood cultures grew Staph auricularis 1 out of the water most likely a contaminant  Patient is still not eating much had a discussion with patient's daughter and as per our conversation I will consult palliative Care  Patient does not want to eat anything and she appears to be in her right mind at this time  On LR but I will switch to Clinimix with electrolytes today and will go at a slow rate  Venous ultrasound was negative for DVT    VTE prophylaxis:  Lovenox    Patient condition:  Stable/Fair/Guarded/ Serious/ Critical    Anticipated discharge and Disposition:         All diagnosis and differential diagnosis have been reviewed; assessment and plan has been documented; I have personally reviewed the labs and test results that are presently available; I have reviewed the patients medication list; I have reviewed the consulting  providers response and recommendations. I have reviewed or attempted to review medical records based upon their availability    All of the patient's questions have been  addressed and answered. Patient's is agreeable to the above stated plan. I will continue to monitor closely and make adjustments to medical management as needed.  _____________________________________________________________________    Nutrition Status:    Radiology:  Fl Modified Barium Swallow Speech  See procedure notes from Speech Pathologist.    This procedure was auto-finalized.      Tamika Abreu MD   08/02/2022               [facility] Hospital Medicine Progress Note        Chief Complaint: Inpatient Follow-up for     HPI: 87 y.o. 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 Patient was COVID positive on 7/5/22. She had labs done at the NH and was given IV fluids for dehydration. She is not the best historian.     Initial ED VS include BP 95/50, HR 65, RR 18, SpO2 100%, temp 96.3F. Labs notable for WBC 16.1, potassium 3.4, chloride 115, CO2 13, glucose 78, magnesium 1.4, COVID positive. Blood cultures x2 pending. CXR revealed a left sided pleural effusion, also concerning for pneumonia. She was started on IV Levaquin. There was mention of her possibly needing a PEG tube, which family members are undecided about    Interval Hx:   Patient seen and examined this morning with patient's daughter bedside sleeping woke up to verbal stimuli updated patient's current status to patient's daughter  Objective/physical exam:  General: In no acute distress, afebrile  Chest: Clear to auscultation bilaterally  Heart: RRR, +S1, S2, no appreciable murmur  Abdomen: Soft, nontender, BS +  MSK: Warm, no  lower extremity edema, no clubbing or cyanosis  Neurologic: Alert and oriented     VITAL SIGNS: 24 HRS MIN & MAX LAST   Temp  Min: 97.7 °F (36.5 °C)  Max: 98.8 °F (37.1 °C) 98.8 °F (37.1 °C)   BP  Min: 109/65  Max: 131/90 109/65   Pulse  Min: 70  Max: 87  87   Resp  Min: 18  Max: 20 18   SpO2  Min: 91 %  Max: 99 % 97 %       Recent Labs   Lab 07/31/22 0447 08/01/22 0452 08/02/22 0459   WBC 7.7 6.3 5.4   RBC 3.59* 3.60* 3.80*   HGB 11.0* 10.9* 11.3*   HCT 33.4* 34.1* 35.8*   MCV 93.0 94.7* 94.2*   MCH 30.6 30.3 29.7   MCHC 32.9* 32.0* 31.6*   RDW 13.3 13.9 13.6    181 161   MPV 9.3 9.7 9.2       Recent Labs   Lab 07/29/22  1402 07/29/22  2115 07/30/22  0811 07/30/22  1112 07/30/22  1247 07/30/22  1545 07/31/22 0447 08/01/22 0452 08/02/22 0459     --  124*   < >  --    < > 135* 136 136   K 3.4*  --  3.3*  --   --    < > 2.6* 4.1 3.5   CO2 13*  --  15*  --   --    < > 22* 22* 21*   BUN 18.6  --  15.3  --   --    < > 10.1 5.0* 3.1*   CREATININE 0.89  --  0.87  --   --    < > 0.82 0.79 0.73   CALCIUM 7.3*  --  8.3*  --   --    < > 7.5* 7.5* 7.9*   PH  --  7.38  --   --  7.58*  --   --   --   --    MG 1.40*  --  1.20*  --   --   --   --  1.40*  --    ALBUMIN 2.5*  --  2.7*  --   --   --  2.1* 2.3*  --    ALKPHOS 52  --  46  --   --   --  47 50  --    ALT 71*  --  63*  --   --   --  47 44  --    AST 22  --  27  --   --   --  17 21  --    BILITOT 0.5  --  1.1  --   --   --  0.5 0.5  --     < > = values in this interval not displayed.          Microbiology Results (last 7 days)     Procedure Component Value Units Date/Time    Blood culture #1 **CANNOT BE ORDERED STAT** [[id number]]  (Normal) Collected: 07/29/22 1406    Order Status: Completed Specimen: Blood from Hand, Right Updated: 08/01/22 1503     CULTURE, BLOOD ([facility]) No Growth At 72 Hours    Blood culture #2 **CANNOT BE ORDERED STAT** [[id number]]  (Abnormal)  (Susceptibility) Collected: 07/29/22 1406    Order Status: Completed Specimen: Blood from Arm,  Right Updated: 08/01/22 0642     CULTURE, BLOOD ([facility]) Staphylococcus auricularis     GRAM STAIN Gram Positive Cocci, probable Staphylococcus      Seen in gram stain of broth only      1 of 1 Pediatric bottle positive     Respiratory Culture [[id number]] Collected: 07/29/22 2147    Order Status: Canceled Specimen: Sputum, Expectorated Updated: 07/29/22 2149           See below for Radiology    Scheduled Med:   atorvastatin  40 mg Oral Daily    cefTRIAXone (ROCEPHIN) IVPB  1 g Intravenous Q24H    clopidogreL  75 mg Oral Daily    enoxaparin  30 mg Subcutaneous Daily    magnesium oxide  400 mg Oral BID    metronidazole  500 mg Intravenous Q8H    sodium chloride 0.9%  10 mL Intravenous Q6H    traZODone  100 mg Oral QHS        Continuous Infusions:   lactated ringers 75 mL/hr at 08/01/22 0845        PRN Meds:  acetaminophen, acetaminophen, clonazePAM, dextrose 50%, dextrose 50%, glucagon (human recombinant), glucose, glucose, insulin aspart U-100, ondansetron, Flushing PICC Protocol **AND** sodium chloride 0.9% **AND** sodium chloride 0.9%       Assessment/Plan:  Sepsis  Lactic acidosis  COVID-19 positive  UTI present on admission with Proteus  Blood cultures positive for Staph most likely contaminant  Metabolic acidosis  Hypokalemia  Hypomagnesemia and hypophosphatemia  Left-sided pleural effusion  Diabetes mellitus type 2  History of anxiety  Peripheral vascular disease  Major depression, insomnia, hypertension, hyperlipidemia, history of coronary artery disease  History of CVA with left-sided hemiplegia  Constipation  Psoriasis    We will switch antibiotics to Rocephin.  The patient is allergic to penicillin but a low bed previous notes from 2018 when she was admitted to ICU for septic shock at that time she was on Rocephin and was discharged home on cefdinir discussed with patient's daughter  Rocephin 1 g Q 24 hours as per the sensitivities  I will discontinue vancomycin as blood culture is growing Staph  auricularis most likely a contaminant  Continue with Rocephin and Flagyl for proctitis  Phosphorus was no will be given Neutra-Phos and will be given 2 g of Mag sulfate  Continue with LR  Speech consulted  Venous ultrasound was negative for DVT    VTE prophylaxis:  Lovenox    Patient condition:  Stable/Fair/Guarded/ Serious/ Critical    Anticipated discharge and Disposition:         All diagnosis and differential diagnosis have been reviewed; assessment and plan has been documented; I have personally reviewed the labs and test results that are presently available; I have reviewed the patients medication list; I have reviewed the consulting providers response and recommendations. I have reviewed or attempted to review medical records based upon their availability    All of the patient's questions have been  addressed and answered. Patient's is agreeable to the above stated plan. I will continue to monitor closely and make adjustments to medical management as needed.  _____________________________________________________________________    Nutrition Status:    Radiology:  Fl Modified Barium Swallow Speech  See procedure notes from Speech Pathologist.    This procedure was auto-finalized.      Tamika Abreu MD   08/02/2022

## 2022-08-02 NOTE — PT/OT/SLP PROGRESS
Nursing reports reduced PO intake however no difficulty with diet tolerance.  Skilled SLP services no longer warranted, please reconsult as needed.

## 2022-08-02 NOTE — CONSULTS
"Inpatient consult to Palliative Care  Consult performed by: Florida Flynn RN  Consult ordered by: Tamika Abreu MD        Patient Name: Cleo Cornelius   MRN: [id number]   Admission Date: 7/29/2022   Hospital Length of Stay: 4   Attending Provider: Dottie Kuo MD   Consulting Provider: [place] Flynn RN  Reason for Consult: Goals of Care  Primary Care Physician: No primary care provider on file.     Principal Problem: Weakness     Patient information was obtained from patient.      Final diagnoses:  [R53.1] Weakness  [J18.9] Pneumonia of left lower lobe due to infectious organism (Primary)  [E87.8] Hyperchloremia     Assessment/Plan:     I reviewed the patient and family's understanding of the seriousness of the illness and its expected prognosis. We discussed the patient's goals of care and treatment preferences. We discussed the difference between palliative and curative medicine. I explained the differences between home health, palliative and hospice care. I clarified current code status. I identified the surrogate decision maker or health care POA. I explained the difference between a living will (advanced directive) and [place].  I answered all questions and we formulated a plan including recommendations for symptom management and how to best achieve goals of care.    Corie Carey, Palliative NP and I met with the patient and her daughter Olesya in the hospital room.       The patient is an 88 y/o female who lived in [place] from the early 1970's until 2005. In 2004 she was  and that's when she decided to "come home" to [place]. She has 6 children - 3 girls and 3 boys. Her daughter Olesya is the only one of her daughters "who has anything to do with her."  Olesya reports that she and all 3 of her brothers are also involved in the patient's life and are in communication regarding the patient's condition. Family reports patient had a stroke in 2014 and that since that time has not had use of " "her LUE or LLE. At that time the patient moved to [facility]. On 7/5/22 she was found to have COVID and was hospitalized for symptom management. When discharged from the hospital she spent some time at [facility] "for a couple of weeks." She was back at the nursing facility long when she developed the most recent symptoms of AMS and fatigue. She was found to have COVID, pneumonia, UTI, and staph infection. According to the the patient's daughter they were considering having a PEG tube for the patient. In questioning the patient she states she would not want that done.    We discussed risks/benefits of feeding tube at length.  Discussed that if patient begins eating, she can return to NH as resident, but would most likely return d/t poor intake.  Discussed patient's progressive decline as described by family and that patient condition may continue to decline.  Discussed the option of hospice if patient stops eating or she or family decide that repeated hospitalizations may be too burdensome.  Daughter     Introduced [facility] as patient already requesting DNR order.  Reviewed with daughter and encouraged to inform us of any questions.  States that she would like to complete with palliative team before patient is discharged.       The patient kept her eyes closed and did not participate in the conversation unless she was asked a direct question. While speaking with Olesya she asked if we would get her brother Cecilio on the phone. Olesya states that she and Cecilio have MPOA but that they want their Mom to make decisions and they will honor her wishes. Olesya asked that we allow her to step out of the room and for us to ask the patient about her wishes regarding a feeding tube. When we asked Mrs. Gallo if she stopped eating would she want a feeding tube her reply was, "y'all just let me go." We reported this information to Olesya and to the patient's primary nurse.  Daughter informed that they are thinking that they would not " want PEG placement at this time.  Offered support and informed we would continue to follow.          Recommendations:   Monitor patient's PO intake and readdress patient's level of orientation and desire for PEG tube if patient becomes unable/unwilling to eat.      History of Present Illness:   Cleo Cornelius is an 88 y/o female with a PMHx of Anxiety, CKD, PVD, major depressive disorder, insomnia, HTN, HLD, DM2, CAD, CVA with residual hemiplegia and hemiparesis, constipation, and psoriasis. who presented to the ED from [facility] on 7/29/22 with symptoms of increased lethargy. She tested positive for Covid on 7/5/22. Her initial workup was notable for being COVID positive, left sided pleural effusion that was concerning for pneumonia.    Upon presentation to the ED the patient was hypotensive with labs notable for WBC 16.1, K+ 3.4, Chloride 115, CO2 13, Glucose 78, Magnesium 1.4, and COVID positive. CXR revealed a left sided pleural effusion, also concerning for pneumonia. She was started on IV Levaquin. Blood cultures positive for Staph in one bottle on 7/31. The patient was on vancomycin at that time. Bedside swallow study done and per primary nurse the patient passed the swallow study.  Patient is lying in bed with daughter present.  Palliative consulted to discuss goals of care.         Active Ambulatory Problems     Diagnosis Date Noted    No Active Ambulatory Problems     Resolved Ambulatory Problems     Diagnosis Date Noted    No Resolved Ambulatory Problems     No Additional Past Medical History        History reviewed. No pertinent surgical history.     Review of patient's allergies indicates:   Allergen Reactions    Penicillins     Sulfa (sulfonamide antibiotics)           Current Facility-Administered Medications:     acetaminophen tablet 650 mg, 650 mg, Oral, Q8H PRN, Rosaura Aguirre, AGACNP-BC    acetaminophen tablet 650 mg, 650 mg, Oral, Q4H PRN, Rosaura Aguirre, AGACNP-BC    Amino acid  4.25% - dextrose 5% (CLINIMIX-E) solution (1L provides 42.5 gm AA, 50 gm CHO (170 kcal/L dextrose), Na 35, K 30, Mg 5, Ca 4.5, Acetate 70, Cl 39, Phos 15), , Intravenous, Continuous, Tamika Abreu MD    atorvastatin tablet 40 mg, 40 mg, Oral, Daily, Dottie Kuo MD, 40 mg at 08/02/22 0940    cefTRIAXone (ROCEPHIN) 1 g in dextrose 5 % in water (D5W) 5 % 50 mL IVPB (MB+), 1 g, Intravenous, Q24H, Tamika Abreu MD, Stopped at 08/01/22 1623    clonazePAM tablet 0.5 mg, 0.5 mg, Oral, BID PRN, Dottie Kuo MD    clopidogreL tablet 75 mg, 75 mg, Oral, Daily, Dottie Kuo MD, 75 mg at 08/02/22 0941    dextrose 50% injection 12.5 g, 12.5 g, Intravenous, PRN, Dottie Kuo MD    dextrose 50% injection 25 g, 25 g, Intravenous, PRN, Dottie Kuo MD    enoxaparin injection 30 mg, 30 mg, Subcutaneous, Daily, Dottie Kuo MD, 30 mg at 08/01/22 1704    glucagon (human recombinant) injection 1 mg, 1 mg, Intramuscular, PRN, Dottie Kuo MD    glucose chewable tablet 16 g, 16 g, Oral, PRN, Dottie Kuo MD    glucose chewable tablet 24 g, 24 g, Oral, PRN, Dottie Kuo MD    insulin aspart U-100 injection 0-5 Units, 0-5 Units, Subcutaneous, QID (AC + HS) PRN, Dottie Kuo MD    magnesium oxide tablet 400 mg, 400 mg, Oral, BID, Dottie Kuo MD, 400 mg at 08/02/22 0941    metronidazole IVPB 500 mg, 500 mg, Intravenous, Q8H, Tamika Abreu MD, Stopped at 08/02/22 1040    ondansetron injection 4 mg, 4 mg, Intravenous, Q8H PRN, Rosaura Aguirre [facility]    Flushing PICC Protocol, , , Until Discontinued **AND** sodium chloride 0.9% flush 10 mL, 10 mL, Intravenous, Q6H, 10 mL at 08/02/22 0638 **AND** sodium chloride 0.9% flush 10 mL, 10 mL, Intravenous, PRN, Dottie Kuo MD, 10 mL at 07/31/22 2134    traZODone tablet 100 mg, 100 mg, Oral, QHS, Dottie Kuo MD, 100 mg at 08/01/22 2231     acetaminophen, acetaminophen, clonazePAM, dextrose 50%, dextrose 50%, glucagon (human  "recombinant), glucose, glucose, insulin aspart U-100, ondansetron, Flushing PICC Protocol **AND** sodium chloride 0.9% **AND** sodium chloride 0.9%     History reviewed. No pertinent family history.     Review of Systems   Unable to perform ROS: Mental status change   Constitutional: Positive for activity change, appetite change and fatigue.   Respiratory: Positive for cough.             Objective:   /68 (BP Location: Left arm, Patient Position: Sitting)   Pulse 83   Temp 98.2 °F (36.8 °C)   Resp 20   Ht 5' 2" (1.575 m)   Wt 81.6 kg (180 lb)   SpO2 98%   BMI 32.92 kg/m²      Physical Exam  Constitutional:       Appearance: She is ill-appearing.   HENT:      Head: Normocephalic.      Right Ear: Tympanic membrane normal.      Left Ear: Tympanic membrane normal.      Nose: Nose normal.      Mouth/Throat:      Mouth: Mucous membranes are moist.   Eyes:      Pupils: Pupils are equal, round, and reactive to light.   Cardiovascular:      Rate and Rhythm: Normal rate and regular rhythm.      Pulses: Normal pulses.   Pulmonary:      Effort: Pulmonary effort is normal.      Comments: Productive cough with unknown color and consistence of phlegm. Breath sounds diminished in RLL but clear in all other lobes.  Abdominal:      Palpations: Abdomen is soft.   Musculoskeletal:         General: Normal range of motion.   Skin:     Capillary Refill: Capillary refill takes 2 to 3 seconds.   Neurological:      Comments: Oriented to person and place.           FAMILY CONTACTS:     Review of Symptoms  Review of Symptoms    Symptom Assessment (ESAS 0-10 Scale)  Pain:  0  Dyspnea:  0  Anxiety:  0  Nausea:  0  Depression:  0  Anorexia:  0  Fatigue:  0  Insomnia:  0  Restlessness:  0  Agitation:  0         Psychosocial/Cultural: 86 y/o  female who has daughter is present in the room. States patient has 6 children and  in 2004.     Spiritual:  F - Jolly and Belief:  Mormonism  I - Importance:  Yes  C - Community:  " Yes  A - Address in Care:  Yes     Time-Based Charting:  No      Advance Care Planning   Advance Directives:   [place]: No    Do Not Resuscitate Status: Yes    Medical Power of : Yes      Decision Making:  Patient answered questions and Family answered questions           Caregiver burden formerly assessed: yes         Florida Flynn RN  Palliative Medicine  [facility]

## 2022-08-03 LAB
ANION GAP SERPL CALC-SCNC: 7 MEQ/L
BACTERIA BLD CULT: NORMAL
BASOPHILS # BLD AUTO: 0.04 X10(3)/MCL (ref 0–0.2)
BASOPHILS NFR BLD AUTO: 0.8 %
BUN SERPL-MCNC: 3.1 MG/DL (ref 9.8–20.1)
CALCIUM SERPL-MCNC: 7.5 MG/DL (ref 8.4–10.2)
CHLORIDE SERPL-SCNC: 109 MMOL/L (ref 98–107)
CO2 SERPL-SCNC: 23 MMOL/L (ref 23–31)
CREAT SERPL-MCNC: 0.77 MG/DL (ref 0.55–1.02)
CREAT/UREA NIT SERPL: 4
EOSINOPHIL # BLD AUTO: 0.24 X10(3)/MCL (ref 0–0.9)
EOSINOPHIL NFR BLD AUTO: 4.7 %
ERYTHROCYTE [DISTWIDTH] IN BLOOD BY AUTOMATED COUNT: 13.8 % (ref 11.5–17)
GLUCOSE SERPL-MCNC: 134 MG/DL (ref 82–115)
HCT VFR BLD AUTO: 35.9 % (ref 37–47)
HGB BLD-MCNC: 11.5 GM/DL (ref 12–16)
IMM GRANULOCYTES # BLD AUTO: 0.04 X10(3)/MCL (ref 0–0.04)
IMM GRANULOCYTES NFR BLD AUTO: 0.8 %
LYMPHOCYTES # BLD AUTO: 1.74 X10(3)/MCL (ref 0.6–4.6)
LYMPHOCYTES NFR BLD AUTO: 34.4 %
MCH RBC QN AUTO: 30.6 PG (ref 27–31)
MCHC RBC AUTO-ENTMCNC: 32 MG/DL (ref 33–36)
MCV RBC AUTO: 95.5 FL (ref 80–94)
MONOCYTES # BLD AUTO: 0.51 X10(3)/MCL (ref 0.1–1.3)
MONOCYTES NFR BLD AUTO: 10.1 %
NEUTROPHILS # BLD AUTO: 2.5 X10(3)/MCL (ref 2.1–9.2)
NEUTROPHILS NFR BLD AUTO: 49.2 %
NRBC BLD AUTO-RTO: 0 %
PLATELET # BLD AUTO: 167 X10(3)/MCL (ref 130–400)
PMV BLD AUTO: 9.3 FL (ref 7.4–10.4)
POCT GLUCOSE: 118 MG/DL (ref 70–110)
POCT GLUCOSE: 143 MG/DL (ref 70–110)
POCT GLUCOSE: 163 MG/DL (ref 70–110)
POTASSIUM SERPL-SCNC: 3.4 MMOL/L (ref 3.5–5.1)
RBC # BLD AUTO: 3.76 X10(6)/MCL (ref 4.2–5.4)
SODIUM SERPL-SCNC: 139 MMOL/L (ref 136–145)
WBC # SPEC AUTO: 5.1 X10(3)/MCL (ref 4.5–11.5)

## 2022-08-03 PROCEDURE — 80048 BASIC METABOLIC PNL TOTAL CA: CPT | Performed by: INTERNAL MEDICINE

## 2022-08-03 PROCEDURE — 36415 COLL VENOUS BLD VENIPUNCTURE: CPT | Performed by: INTERNAL MEDICINE

## 2022-08-03 PROCEDURE — 99233 PR SUBSEQUENT HOSPITAL CARE,LEVL III: ICD-10-PCS | Mod: CR,,, | Performed by: NURSE PRACTITIONER

## 2022-08-03 PROCEDURE — 85025 COMPLETE CBC W/AUTO DIFF WBC: CPT | Performed by: INTERNAL MEDICINE

## 2022-08-03 PROCEDURE — A4216 STERILE WATER/SALINE, 10 ML: HCPCS | Performed by: INTERNAL MEDICINE

## 2022-08-03 PROCEDURE — S0030 INJECTION, METRONIDAZOLE: HCPCS | Performed by: INTERNAL MEDICINE

## 2022-08-03 PROCEDURE — 27000207 HC ISOLATION

## 2022-08-03 PROCEDURE — 11000001 HC ACUTE MED/SURG PRIVATE ROOM

## 2022-08-03 PROCEDURE — 25000003 PHARM REV CODE 250: Performed by: INTERNAL MEDICINE

## 2022-08-03 PROCEDURE — 99233 SBSQ HOSP IP/OBS HIGH 50: CPT | Mod: CR,,, | Performed by: NURSE PRACTITIONER

## 2022-08-03 PROCEDURE — 63600175 PHARM REV CODE 636 W HCPCS: Performed by: INTERNAL MEDICINE

## 2022-08-03 RX ORDER — METRONIDAZOLE 500 MG/1
500 TABLET ORAL EVERY 8 HOURS
Status: DISCONTINUED | OUTPATIENT
Start: 2022-08-03 | End: 2022-08-05 | Stop reason: HOSPADM

## 2022-08-03 RX ADMIN — METRONIDAZOLE 500 MG: 500 TABLET ORAL at 09:08

## 2022-08-03 RX ADMIN — POTASSIUM BICARBONATE 25 MEQ: 977.5 TABLET, EFFERVESCENT ORAL at 02:08

## 2022-08-03 RX ADMIN — METRONIDAZOLE 500 MG: 500 INJECTION, SOLUTION INTRAVENOUS at 09:08

## 2022-08-03 RX ADMIN — TRAZODONE HYDROCHLORIDE 100 MG: 100 TABLET ORAL at 09:08

## 2022-08-03 RX ADMIN — SODIUM CHLORIDE, PRESERVATIVE FREE 10 ML: 5 INJECTION INTRAVENOUS at 02:08

## 2022-08-03 RX ADMIN — ENOXAPARIN SODIUM 30 MG: 30 INJECTION SUBCUTANEOUS at 04:08

## 2022-08-03 RX ADMIN — ATORVASTATIN CALCIUM 40 MG: 40 TABLET, FILM COATED ORAL at 09:08

## 2022-08-03 RX ADMIN — METRONIDAZOLE 500 MG: 500 INJECTION, SOLUTION INTRAVENOUS at 02:08

## 2022-08-03 RX ADMIN — SODIUM CHLORIDE, PRESERVATIVE FREE 10 ML: 5 INJECTION INTRAVENOUS at 04:08

## 2022-08-03 RX ADMIN — SODIUM CHLORIDE, PRESERVATIVE FREE 10 ML: 5 INJECTION INTRAVENOUS at 05:08

## 2022-08-03 RX ADMIN — Medication 400 MG: at 09:08

## 2022-08-03 RX ADMIN — METRONIDAZOLE 500 MG: 500 TABLET ORAL at 02:08

## 2022-08-03 RX ADMIN — CEFTRIAXONE SODIUM 1 G: 1 INJECTION, POWDER, FOR SOLUTION INTRAMUSCULAR; INTRAVENOUS at 02:08

## 2022-08-03 RX ADMIN — CLOPIDOGREL 75 MG: 75 TABLET, FILM COATED ORAL at 09:08

## 2022-08-03 NOTE — PLAN OF CARE
Order for hospice noted. Spoke to daughter Olesya per phone. List of providers given. She will discuss with family and Dillon Murphy. Will follow up in am for choice.

## 2022-08-03 NOTE — CONSULTS
"Consults     Patient Name: Cleo Cornelius   MRN: [id number]   Admission Date: 7/29/2022   Hospital Length of Stay: 5   Attending Provider: Dottie Kuo MD   Consulting Provider: Corie CABRERA  Reason for Consult: Goals of Care  Primary Care Physician:  No primary care provider on file.     Principal Problem: Weakness       Final diagnoses:  [R53.1] Weakness  [J18.9] Pneumonia of left lower lobe due to infectious organism (Primary)  [E87.8] Hyperchloremia      Assessment/Plan:     I reviewed the patient and family's understanding of the seriousness of the illness and its expected prognosis. We discussed the patient's goals of care and treatment preferences.       Spoke to staff nurse who reports that family has decided to pursue hospice services and does not want feeding tube placed.  Family left LaPOST in room for palliative team to have MD signed.      Called son Cecilio to discuss plan.  States that he and his siblings spoke and decided that they would like to pursue comfort measures and hospice at NH.  States that his sister was at hospital earlier today, but had "to take a break" as it is upsetting her that patient will not eat.  Offered support and encouraged family to call for any questions/concerns.      Informed I would place order and update staff.  Made copies of [place]ost and provided copy for chart and original and copies returned to the room.  Updated hospitalist and CM.            Interval History:     Patient lying in bed and continues to refuse to eat.  Patient continues to receive IV antibiotics.  Hospitalist spoke with family and discussed feeding tube.  Palliative care following to discuss goals of care.     Active Ambulatory Problems     Diagnosis Date Noted    No Active Ambulatory Problems     Resolved Ambulatory Problems     Diagnosis Date Noted    No Resolved Ambulatory Problems     No Additional Past Medical History        History reviewed. No pertinent surgical history.     Review of " patient's allergies indicates:   Allergen Reactions    Penicillins     Sulfa (sulfonamide antibiotics)           Current Facility-Administered Medications:     acetaminophen tablet 650 mg, 650 mg, Oral, Q8H PRN, REYNA Hawley-BC    acetaminophen tablet 650 mg, 650 mg, Oral, Q4H PRN, REYNA Hawley-BC    atorvastatin tablet 40 mg, 40 mg, Oral, Daily, Dottie Kuo MD, 40 mg at 08/03/22 0928    cefTRIAXone (ROCEPHIN) 1 g in dextrose 5 % in water (D5W) 5 % 50 mL IVPB (MB+), 1 g, Intravenous, Q24H, Tamika Abreu MD, Stopped at 08/03/22 1503    clonazePAM tablet 0.5 mg, 0.5 mg, Oral, BID PRN, Dottie Kuo MD    clopidogreL tablet 75 mg, 75 mg, Oral, Daily, Dottie Kuo MD, 75 mg at 08/03/22 0928    dextrose 50% injection 12.5 g, 12.5 g, Intravenous, PRN, Dottie Kuo MD    dextrose 50% injection 25 g, 25 g, Intravenous, PRN, Dottie Kuo MD    enoxaparin injection 30 mg, 30 mg, Subcutaneous, Daily, Dottie Kuo MD, 30 mg at 08/02/22 1724    glucagon (human recombinant) injection 1 mg, 1 mg, Intramuscular, PRN, Dottie Kuo MD    glucose chewable tablet 16 g, 16 g, Oral, PRN, Dottie Kuo MD    glucose chewable tablet 24 g, 24 g, Oral, PRN, Dottie Kuo MD    insulin aspart U-100 injection 0-5 Units, 0-5 Units, Subcutaneous, QID (AC + HS) PRN, Dottie Kuo MD    magnesium oxide tablet 400 mg, 400 mg, Oral, BID, Dottie Kuo MD, 400 mg at 08/03/22 0928    metroNIDAZOLE tablet 500 mg, 500 mg, Oral, Q8H, Tamika Abreu MD, 500 mg at 08/03/22 1434    ondansetron injection 4 mg, 4 mg, Intravenous, Q8H PRN, CALEB Hawley    Flushing PICC Protocol, , , Until Discontinued **AND** sodium chloride 0.9% flush 10 mL, 10 mL, Intravenous, Q6H, 10 mL at 08/03/22 1434 **AND** sodium chloride 0.9% flush 10 mL, 10 mL, Intravenous, PRN, Dottie Kuo MD, 10 mL at 07/31/22 2134    traZODone tablet 100 mg, 100 mg, Oral, QHS, Dottie Kuo MD, 100 mg  "at 08/02/22 2030     acetaminophen, acetaminophen, clonazePAM, dextrose 50%, dextrose 50%, glucagon (human recombinant), glucose, glucose, insulin aspart U-100, ondansetron, Flushing PICC Protocol **AND** sodium chloride 0.9% **AND** sodium chloride 0.9%     History reviewed. No pertinent family history.       Review of Systems   Unable to perform ROS: Acuity of condition    Does not participate in conversation        Objective:   /69 (BP Location: Left arm, Patient Position: Lying)   Pulse 73   Temp 97.3 °F (36.3 °C) (Oral)   Resp 19   Ht 5' 2" (1.575 m)   Wt 81.6 kg (180 lb)   SpO2 (!) 94%   BMI 32.92 kg/m²      Physical Exam   Constitutional: She appears ill.   HENT:   Head: Normocephalic.   Eyes: Pupils are equal, round, and reactive to light.   Cardiovascular: Regular rhythm.   Pulmonary/Chest: Effort normal and breath sounds normal.   Abdominal: Soft.   Musculoskeletal:      Comments: sarcopenia   Neurological:   To person, responds with simple questions   Skin: Skin is warm.          Review of Symptoms  Review of Symptoms    Symptom Assessment (ESAS 0-10 Scale)  Pain:  0  Dyspnea:  0  Anxiety:  0  Nausea:  0  Depression:  0  Anorexia:  0  Fatigue:  0  Insomnia:  0  Restlessness:  0  Agitation:  0         Performance Status:  30      Advance Care Planning   Advance Directives:   LaPOST: Yes    Do Not Resuscitate Status: Yes      Decision Making:  Family answered questions          PAINAD: Appears comfortable    Caregiver burden formerly assessed: yes      No results displayed because visit has over 200 results.               > 50% of 35 min of encounter was spent in chart review, face to face discussion of goals of care, symptom assessment, coordination of care and emotional support.    Corie Carey FNP, [facility]  Palliative Medicine  [facility] - Observation Unit  "

## 2022-08-04 LAB
ANION GAP SERPL CALC-SCNC: 7 MEQ/L
BASOPHILS # BLD AUTO: 0.06 X10(3)/MCL (ref 0–0.2)
BASOPHILS NFR BLD AUTO: 1.3 %
BUN SERPL-MCNC: 4.3 MG/DL (ref 9.8–20.1)
CALCIUM SERPL-MCNC: 7.8 MG/DL (ref 8.4–10.2)
CHLORIDE SERPL-SCNC: 111 MMOL/L (ref 98–107)
CO2 SERPL-SCNC: 22 MMOL/L (ref 23–31)
CREAT SERPL-MCNC: 0.85 MG/DL (ref 0.55–1.02)
CREAT/UREA NIT SERPL: 5
EOSINOPHIL # BLD AUTO: 0.2 X10(3)/MCL (ref 0–0.9)
EOSINOPHIL NFR BLD AUTO: 4.4 %
ERYTHROCYTE [DISTWIDTH] IN BLOOD BY AUTOMATED COUNT: 14.2 % (ref 11.5–17)
GLUCOSE SERPL-MCNC: 101 MG/DL (ref 82–115)
HCT VFR BLD AUTO: 34.8 % (ref 37–47)
HGB BLD-MCNC: 10.7 GM/DL (ref 12–16)
IMM GRANULOCYTES # BLD AUTO: 0.03 X10(3)/MCL (ref 0–0.04)
IMM GRANULOCYTES NFR BLD AUTO: 0.7 %
LYMPHOCYTES # BLD AUTO: 1.69 X10(3)/MCL (ref 0.6–4.6)
LYMPHOCYTES NFR BLD AUTO: 37.5 %
MCH RBC QN AUTO: 30.1 PG (ref 27–31)
MCHC RBC AUTO-ENTMCNC: 30.7 MG/DL (ref 33–36)
MCV RBC AUTO: 98 FL (ref 80–94)
MONOCYTES # BLD AUTO: 0.59 X10(3)/MCL (ref 0.1–1.3)
MONOCYTES NFR BLD AUTO: 13.1 %
NEUTROPHILS # BLD AUTO: 1.9 X10(3)/MCL (ref 2.1–9.2)
NEUTROPHILS NFR BLD AUTO: 43 %
NRBC BLD AUTO-RTO: 0 %
PLATELET # BLD AUTO: 161 X10(3)/MCL (ref 130–400)
PMV BLD AUTO: 9.4 FL (ref 7.4–10.4)
POCT GLUCOSE: 106 MG/DL (ref 70–110)
POCT GLUCOSE: 113 MG/DL (ref 70–110)
POCT GLUCOSE: 94 MG/DL (ref 70–110)
POTASSIUM SERPL-SCNC: 3.6 MMOL/L (ref 3.5–5.1)
RBC # BLD AUTO: 3.55 X10(6)/MCL (ref 4.2–5.4)
SODIUM SERPL-SCNC: 140 MMOL/L (ref 136–145)
WBC # SPEC AUTO: 4.5 X10(3)/MCL (ref 4.5–11.5)

## 2022-08-04 PROCEDURE — 63600175 PHARM REV CODE 636 W HCPCS: Performed by: INTERNAL MEDICINE

## 2022-08-04 PROCEDURE — A4216 STERILE WATER/SALINE, 10 ML: HCPCS | Performed by: INTERNAL MEDICINE

## 2022-08-04 PROCEDURE — 25000003 PHARM REV CODE 250: Performed by: INTERNAL MEDICINE

## 2022-08-04 PROCEDURE — 36415 COLL VENOUS BLD VENIPUNCTURE: CPT | Performed by: INTERNAL MEDICINE

## 2022-08-04 PROCEDURE — 27000207 HC ISOLATION

## 2022-08-04 PROCEDURE — 85025 COMPLETE CBC W/AUTO DIFF WBC: CPT | Performed by: INTERNAL MEDICINE

## 2022-08-04 PROCEDURE — 80048 BASIC METABOLIC PNL TOTAL CA: CPT | Performed by: INTERNAL MEDICINE

## 2022-08-04 PROCEDURE — 11000001 HC ACUTE MED/SURG PRIVATE ROOM

## 2022-08-04 RX ORDER — METOPROLOL TARTRATE 1 MG/ML
5 INJECTION, SOLUTION INTRAVENOUS EVERY 5 MIN PRN
Status: DISCONTINUED | OUTPATIENT
Start: 2022-08-04 | End: 2022-08-05 | Stop reason: HOSPADM

## 2022-08-04 RX ADMIN — METRONIDAZOLE 500 MG: 500 TABLET ORAL at 05:08

## 2022-08-04 RX ADMIN — CEFTRIAXONE SODIUM 1 G: 1 INJECTION, POWDER, FOR SOLUTION INTRAMUSCULAR; INTRAVENOUS at 02:08

## 2022-08-04 RX ADMIN — SODIUM CHLORIDE, PRESERVATIVE FREE 10 ML: 5 INJECTION INTRAVENOUS at 03:08

## 2022-08-04 RX ADMIN — SODIUM CHLORIDE, PRESERVATIVE FREE 10 ML: 5 INJECTION INTRAVENOUS at 05:08

## 2022-08-04 RX ADMIN — CLOPIDOGREL 75 MG: 75 TABLET, FILM COATED ORAL at 11:08

## 2022-08-04 RX ADMIN — SODIUM CHLORIDE, PRESERVATIVE FREE 10 ML: 5 INJECTION INTRAVENOUS at 06:08

## 2022-08-04 RX ADMIN — METRONIDAZOLE 500 MG: 500 TABLET ORAL at 10:08

## 2022-08-04 RX ADMIN — ENOXAPARIN SODIUM 30 MG: 30 INJECTION SUBCUTANEOUS at 04:08

## 2022-08-04 RX ADMIN — METRONIDAZOLE 500 MG: 500 TABLET ORAL at 02:08

## 2022-08-04 RX ADMIN — SODIUM CHLORIDE, PRESERVATIVE FREE 10 ML: 5 INJECTION INTRAVENOUS at 11:08

## 2022-08-04 RX ADMIN — TRAZODONE HYDROCHLORIDE 100 MG: 100 TABLET ORAL at 09:08

## 2022-08-04 RX ADMIN — ATORVASTATIN CALCIUM 40 MG: 40 TABLET, FILM COATED ORAL at 11:08

## 2022-08-04 NOTE — PROGRESS NOTES
[facility] Hospital Medicine Progress Note        Chief Complaint: Inpatient Follow-up for     HPI:   87 y.o. 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 Patient was COVID positive on 7/5/22. She had labs done at the NH and was given IV fluids for dehydration. She is not the best historian.     Initial ED VS include BP 95/50, HR 65, RR 18, SpO2 100%, temp 96.3F. Labs notable for WBC 16.1, potassium 3.4, chloride 115, CO2 13, glucose 78, magnesium 1.4, COVID positive. Blood cultures x2 pending. CXR revealed a left sided pleural effusion, also concerning for pneumonia. She was started on IV Levaquin. There was mention of her possibly needing a PEG tube, which family members are undecided about  Interval Hx:   Hemodynamically stable.  No acute events overnight.  Stable from respiratory standpoint.  Currently awaiting hospice transition.  Labs from this morning show stable hemoglobin, platelets, improved potassium.    Objective/physical exam:  Vitals:    08/03/22 2002 08/04/22 0110 08/04/22 0423 08/04/22 0755   BP: 100/65 95/61 94/62 110/65   BP Location:  Left arm  Left arm   Patient Position:  Lying  Lying   Pulse: 86 78 76 76   Resp: 18 16 16 16   Temp: 98.8 °F (37.1 °C) 98.5 °F (36.9 °C) 98.4 °F (36.9 °C) 98.4 °F (36.9 °C)   TempSrc: Oral Oral Oral Oral   SpO2: (!) 93% (!) 93% (!) 94% (!) 93%   Weight:       Height:         General: In no acute distress, afebrile  Respiratory: Clear to auscultation bilaterally  Cardiovascular: S1, S2, no appreciable murmur  Abdomen: Soft, nontender, BS +  MSK: Warm, no lower extremity edema, no clubbing or cyanosis  Neurologic: Alert and oriented x4, moving all extremities with good strength     Lab Results   Component Value  Date     08/04/2022    K 3.6 08/04/2022    CO2 22 (L) 08/04/2022    BUN 4.3 (L) 08/04/2022    CREATININE 0.85 08/04/2022    CALCIUM 7.8 (L) 08/04/2022    EGFRNONAA >60 08/04/2022      Lab Results   Component Value Date    ALT 44 08/01/2022    AST 21 08/01/2022    ALKPHOS 50 08/01/2022    BILITOT 0.5 08/01/2022      Lab Results   Component Value Date    WBC 4.5 08/04/2022    HGB 10.7 (L) 08/04/2022    HCT 34.8 (L) 08/04/2022    MCV 98.0 (H) 08/04/2022     08/04/2022           Medications:   atorvastatin  40 mg Oral Daily    cefTRIAXone (ROCEPHIN) IVPB  1 g Intravenous Q24H    clopidogreL  75 mg Oral Daily    enoxaparin  30 mg Subcutaneous Daily    metroNIDAZOLE  500 mg Oral Q8H    sodium chloride 0.9%  10 mL Intravenous Q6H    traZODone  100 mg Oral QHS      acetaminophen, acetaminophen, clonazePAM, dextrose 50%, dextrose 50%, glucagon (human recombinant), glucose, glucose, insulin aspart U-100, ondansetron, Flushing PICC Protocol **AND** sodium chloride 0.9% **AND** sodium chloride 0.9%     Assessment/Plan:    Sepsis  Lactic acidosis  COVID-19 positive  UTI present on admission with Proteus  Blood cultures positive for Staph most likely contaminant  Metabolic acidosis  Proctitis  Hypokalemia - improved  Hypomagnesemia and hypophosphatemia  Left-sided pleural effusion  constipation    Hx: T2Dm, Anxiety, CAD,  PVD, HTN, HLD, Depression, insomnia, CVA with left-sided hemiplegia, Psoriasis      Plan:  - Family decided against PEG tube.  Currently working in hospice  - Currently being treated with antibiotics for proctitis.  Vancomycin discontinued  - Goals of care reviewed by palliative team    Yamileth Parmar MD

## 2022-08-04 NOTE — PLAN OF CARE
Spoke to daughter Olesya about hospice per phone. Olesya still have not made decision about hospice choice. She is waiting call back for [facility]

## 2022-08-04 NOTE — PLAN OF CARE
Spoke to daughter about hospice choice. List of providers given. FOC obtained for [facility]. [facility] Spoke to Janet at [facility]. Hope rep winapoleon contact daughter. Spoke to Diana at [facility]. Unable to take patient today. Plan for discharge tomorrow.

## 2022-08-04 NOTE — PLAN OF CARE
Problem: Infection  Goal: Absence of Infection Signs and Symptoms  Outcome: Ongoing, Progressing     Problem: Adult Inpatient Plan of Care  Goal: Plan of Care Review  Outcome: Ongoing, Progressing  Goal: Patient-Specific Goal (Individualized)  Outcome: Ongoing, Progressing  Goal: Absence of Hospital-Acquired Illness or Injury  Outcome: Ongoing, Progressing  Goal: Optimal Comfort and Wellbeing  Outcome: Ongoing, Progressing  Goal: Readiness for Transition of Care  Outcome: Ongoing, Progressing     Problem: Skin Injury Risk Increased  Goal: Skin Health and Integrity  Outcome: Ongoing, Progressing     Problem: Fall Injury Risk  Goal: Absence of Fall and Fall-Related Injury  Outcome: Ongoing, Progressing     Problem: Impaired Wound Healing  Goal: Optimal Wound Healing  Outcome: Ongoing, Progressing     Problem: Coping Ineffective  Goal: Effective Coping  Outcome: Ongoing, Progressing

## 2022-08-04 NOTE — PLAN OF CARE
Received call from Diana at [facility]. Dr. Nacho Carranza is requesting to speak to hospitalist before approving discharge back with hospice. Dr. TRUJILLO notified.

## 2022-08-05 VITALS
BODY MASS INDEX: 33.13 KG/M2 | RESPIRATION RATE: 18 BRPM | HEART RATE: 77 BPM | SYSTOLIC BLOOD PRESSURE: 102 MMHG | TEMPERATURE: 99 F | HEIGHT: 62 IN | OXYGEN SATURATION: 96 % | WEIGHT: 180 LBS | DIASTOLIC BLOOD PRESSURE: 67 MMHG

## 2022-08-05 DIAGNOSIS — U07.1 COVID-19 VIRUS DETECTED: ICD-10-CM

## 2022-08-05 LAB — POCT GLUCOSE: 172 MG/DL (ref 70–110)

## 2022-08-05 PROCEDURE — A4216 STERILE WATER/SALINE, 10 ML: HCPCS | Performed by: INTERNAL MEDICINE

## 2022-08-05 PROCEDURE — 25000003 PHARM REV CODE 250: Performed by: INTERNAL MEDICINE

## 2022-08-05 RX ORDER — METRONIDAZOLE 500 MG/1
500 TABLET ORAL EVERY 8 HOURS
Qty: 9 TABLET | Refills: 0 | Status: SHIPPED | OUTPATIENT
Start: 2022-08-05 | End: 2022-08-08

## 2022-08-05 RX ORDER — CIPROFLOXACIN 500 MG/1
500 TABLET ORAL 2 TIMES DAILY
Qty: 6 TABLET | Refills: 0 | Status: SHIPPED | OUTPATIENT
Start: 2022-08-05 | End: 2022-08-08

## 2022-08-05 RX ADMIN — ATORVASTATIN CALCIUM 40 MG: 40 TABLET, FILM COATED ORAL at 09:08

## 2022-08-05 RX ADMIN — METRONIDAZOLE 500 MG: 500 TABLET ORAL at 06:08

## 2022-08-05 RX ADMIN — SODIUM CHLORIDE, PRESERVATIVE FREE 10 ML: 5 INJECTION INTRAVENOUS at 06:08

## 2022-08-05 RX ADMIN — CLOPIDOGREL 75 MG: 75 TABLET, FILM COATED ORAL at 09:08

## 2022-08-05 NOTE — NURSING
Pt. Dc to SNF. IV, PICC line, & tele removed. Nurse called report to Jaron @[phone]. Pt. Went to SNF in [facility]. Pt.'s daughter will follow ambulance to the SNF and will notify siblings about pt.'s transfer. Pt.'s personnel items send with daughter.

## 2022-08-05 NOTE — PLAN OF CARE
Received call from Dianna at [facility]. Patient cannot return hospice. Dr. Camejo will asses patient upon return to [facility] and order hospice if needed. Discharge documentation [facility] to [facility]. Nurse to call report to Dayana Garcias will transport.

## 2022-08-05 NOTE — DISCHARGE SUMMARY
[facility] - 6th Floor [facility] Medicine  Discharge Summary      Patient Name: Cleo Cornelius  MRN: [id number]  Patient Class: IP- Inpatient  Admission Date: 7/29/2022  Hospital Length of Stay: 7 days  Discharge Date and Time:  08/05/2022 12:27 PM  Attending Physician: Dottie Kuo MD   Discharging Provider: Eb Parmar MD  Primary Care Provider: No primary care provider on file.    87 y.o. 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 Patient was COVID positive on 7/5/22. She had labs done at the NH and was given IV fluids for dehydration. She is not the best historian.     Initial ED VS include BP 95/50, HR 65, RR 18, SpO2 100%, temp 96.3F. Labs notable for WBC 16.1, potassium 3.4, chloride 115, CO2 13, glucose 78, magnesium 1.4, COVID positive. Blood cultures x2 pending. CXR revealed a left sided pleural effusion, also concerning for pneumonia. She was started on IV Levaquin.     CT abdomen obtained for further workup revealed mild rectal wall thickening just above the rectal tube tip which could be secondary to to assess mild proctitis.  Left lower lobe consolidation was seen suggestive of pneumonia.  Antibiotics were switched to ceftriaxone and Flagyl.  During hospital stay patient refused feeding and was reluctant to participate in care.  Palliative team was consulted for goals of care discussion.  Decision was made for hospice care.  However she will be discharged back to nursing home and hospice will be considered by nursing home physician.  She will complete antibiotics for possible proctitis and FERNANDO as outpatient.    Sepsis w/ Lactic acidosis resolving  COVID-19 positive  UTI present on admission with Proteus  Blood cultures positive for  Staph most likely contaminant  Metabolic acidosis  Proctitis  Hypokalemia - improved  Hypomagnesemia and hypophosphatemia  Left-sided pleural effusion  constipation     Hx: T2Dm, Anxiety, CAD,  PVD, HTN, HLD, Depression, insomnia, CVA with left-sided hemiplegia, Psoriasis      * No surgery found *      Goals of Care Treatment Preferences:  Code Status: DNR      Consults:   Consults (From admission, onward)        Status Ordering Provider     Inpatient consult to Social Work/Case Management  Once        Provider:  (Not yet assigned)    Acknowledged NATA MCKEON     Inpatient consult to Palliative Care  Once        Provider:  Scott Bansal MD    Completed KAYLA MAJOR     Inpatient consult to Registered Dietitian/Nutritionist  Once        Provider:  (Not yet assigned)    Completed WILI BILL     Inpatient consult to Midline team  Once        Provider:  (Not yet assigned)    Acknowledged KAYLA MAJOR     Inpatient consult to Nephrology  Once        Provider:  Brady Yuan MD    Completed KAYLA MAJOR     Pharmacy to dose Vancomycin consult  Once        Provider:  (Not yet assigned)    Acknowledged KAYLA MAJOR     IP consult to case management  Once        Provider:  (Not yet assigned)    Acknowledged WILI BILL          No new Assessment & Plan notes have been filed under this hospital service since the last note was generated.  Service: Hospital Medicine    Final Active Diagnoses:    Diagnosis Date Noted POA    Weakness [R53.1] 07/29/2022 Unknown      Problems Resolved During this Admission:    Diagnosis Date Noted Date Resolved POA    PRINCIPAL PROBLEM:  COVID-19 [U07.1] 08/05/2022 08/05/2022 Yes       Discharged Condition: fair    Disposition:     Follow Up:   Follow-up Information     PCP Follow up in 1 week(s).                     Patient Instructions:   No discharge procedures on file.    Significant Diagnostic Studies: Labs:   CMP   Recent Labs   Lab 08/04/22  1388       K 3.6   CO2 22*   BUN 4.3*   CREATININE 0.85   CALCIUM 7.8*   EGFRNONAA >60       Pending Diagnostic Studies:     Procedure Component Value Units Date/Time    Cortisol, Serum [[id number]] Collected: 08/01/22 1435    Order Status: Sent Lab Status: No result     Specimen: Blood     Sodium, Random Urine [[id number]]     Order Status: Sent Lab Status: No result     Specimen: Urine          Medications:  Reconciled Home Medications:      Medication List      START taking these medications    ciprofloxacin HCl 500 MG tablet  Commonly known as: CIPRO  Take 1 tablet (500 mg total) by mouth 2 (two) times daily. for 3 days     metroNIDAZOLE 500 MG tablet  Commonly known as: FLAGYL  Take 1 tablet (500 mg total) by mouth every 8 (eight) hours. for 3 days        CONTINUE taking these medications    amLODIPine 5 MG tablet  Commonly known as: NORVASC  Take 5 mg by mouth once daily.     atorvastatin 40 MG tablet  Commonly known as: LIPITOR  Take 40 mg by mouth once daily.     clonazePAM 0.5 MG tablet  Commonly known as: KlonoPIN  Take 0.5 mg by mouth 2 (two) times daily as needed for Anxiety.     clopidogreL 75 mg tablet  Commonly known as: PLAVIX  Take 75 mg by mouth once daily.     DULoxetine 60 MG capsule  Commonly known as: CYMBALTA  Take 60 mg by mouth once daily.     meloxicam 7.5 MG tablet  Commonly known as: MOBIC  Take 7.5 mg by mouth once daily.     traZODone 100 MG tablet  Commonly known as: DESYREL  Take 100 mg by mouth every evening.        STOP taking these medications    hydrALAZINE 25 MG tablet  Commonly known as: APRESOLINE            Indwelling Lines/Drains at time of discharge:   Lines/Drains/Airways     Peripherally Inserted Central Catheter Line  Duration           PICC Triple Lumen 07/30/22 2200 right brachial 5 days          Drain  Duration           Female External Urinary Catheter 07/30/22 0900 6 days                Time spent on the discharge of patient: 35 minutes         Eb Parmar,  MD  Department of Hospital Medicine  [facility] - 6th Floor Medical Telemetry

## 2022-08-05 NOTE — PROGRESS NOTES
[facility] - 6th Floor Medical Telemetry  Adult Nutrition  Consult Note    SUMMARY     Recommendations    1. Continue Regular diet as tolerated    2. Honor pt/family wishes      Goals: Pt will meet >/=75% estimated nutritional needs by f/u  Nutrition Goal Status: ongoing    Assessment and Plan    8/1: Pt NPO per SLP. Awaiting MBS. Clinimix started, but held d/t hyponatremia yesterday. Per MD, holding today as well. Noted altered electrolytes (phos, mg, K+) - being replaced. Per NP note, pt not eating anything for last x 2 wks. At risk for refeeding. If SLP is unable to advance diet, would rec'd EN as pt able to use gut.     8/5: Pt and family decided on nsg home with hospice services. SLP has approved current diet. Pt continues to refuse to eat most meals. Family does not want Peg placement. Will honor pt/family wishes. Clinimix d/c'd on 8/3.       Malnutrition Assessment    Malnutrition in the context of acute illness or injury  Degree of Malnutrition: N/A-not appropriate at this time due to decision for Hospice  Energy Intake:  </= 50% of estimated energy requirement for >/= 5 days  Interpretation of Weight Loss:  unable to obtain  Body Fat: unable to obtain  Area of Body Fat Loss:  unable to obtain  Muscle Mass Loss:  Unable to obtain   Area of Muscle Mass Loss: Unable to obtain   Fluid Accumulation:  unable to obtain  Edema:  unable to obtain   Reduced  Strength:  unable to obtain    A minimum of two characteristics is recommended for diagnosis of either severe or non-severe malnutrition.        Reason for Assessment    Diagnosis:  (Sepsis, Lactic acidosis, COVID-19+, UTI, Metabolic acidosis,Hyponatremia, Hypokalemia  Hypomagnesemia, hypophosphatemia, Left-sided pleural effusion ,DM2)  Relevant Medical History: anxiety  Peripheral vascular disease  Major depression, insomnia, , hyperlipidemia, history of coronary artery disease  History of CVA with left-sided hemiplegia  Constipation   "Psoriasis    Nutrition Risk Screen    Nutrition Risk Screen: other (see comments) (Not eating; loss of appetite)    Nutrition/Diet History    Spiritual, Cultural Beliefs, Buddhism Practices, Values that Affect Care: no    Anthropometrics    Temp: 98.2 °F (36.8 °C)  Height Method: Estimated  Height: 5' 2" (157.5 cm)  Height (inches): 62 in  Weight Method: Estimated  Weight: 81.6 kg (180 lb)  Weight (lb): 180 lb  Ideal Body Weight (IBW), Female: 110 lb  % Ideal Body Weight, Female (lb): 163.64 %  BMI (Calculated): 32.9    Wt Readings from Last 5 Encounters:   07/29/22 81.6 kg (180 lb)     Unable to obtain UBW at this time.     Lab/Procedures/Meds    Pertinent Labs Comments: Mg 1.4(L), Phos 1.8(L), Ca 7.5(L), Alb 2.3(L), GFR >60, H/H 10.9/34.1(L)  8/4: H/H-10.7/34.8, Bun-4.3, Ca-7.8  Pertinent Medications Comments: Metronidazole    Estimated/Assessed Needs    Weight Used For Calorie Calculations: 81.6 kg (179 lb 14.3 oz)  Energy Calorie Requirements (kcal): 1578 kcal/kg (MSJ x 1.3 SF)  Protein Requirements: 102 (1.25 g/kg (ST 2 PU))  Weight Used For Protein Calculations: 81.6 kg (179 lb 14.3 oz)  RDA Method (mL): 1578       Nutrition Prescription Ordered    Current Diet Order: Regular    Evaluation of Received Nutrient/Fluid Intake    % Intake of Estimated Energy Needs: 0 - 25 %  % Meal Intake: 0 - 25 %    Nutrition Risk    Level of Risk/Frequency of Follow-up: high       Monitor and Evaluation      Wt, po intake, nutrition related labs       Nutrition Follow-Up    RD Follow-up?: Yes  "

## 2022-09-26 ENCOUNTER — HOSPITAL ENCOUNTER (EMERGENCY)
Facility: HOSPITAL | Age: 87
Discharge: HOME | End: 2022-09-27
Attending: EMERGENCY MEDICINE
Payer: MEDICARE

## 2022-09-26 DIAGNOSIS — Z01.818 ENCOUNTER FOR PREOPERATIVE ASSESSMENT: ICD-10-CM

## 2022-09-26 DIAGNOSIS — Z79.02 PLATELET INHIBITION DUE TO PLAVIX: ICD-10-CM
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
## 2022-09-26 DIAGNOSIS — S82.132A CLOSED FRACTURE OF MEDIAL PORTION OF LEFT TIBIAL PLATEAU, INITIAL ENCOUNTER: ICD-10-CM

## 2022-09-26 DIAGNOSIS — S42.295A OTHER CLOSED NONDISPLACED FRACTURE OF PROXIMAL END OF LEFT HUMERUS, INITIAL ENCOUNTER: Primary | ICD-10-CM

## 2022-09-26 DIAGNOSIS — M23.92 ACUTE INTERNAL DERANGEMENT OF LEFT KNEE: ICD-10-CM

## 2022-09-26 DIAGNOSIS — M25.00 LIPOHEMARTHROSIS: ICD-10-CM

## 2022-09-26 DIAGNOSIS — I73.9 PERIPHERAL ARTERIAL DISEASE: ICD-10-CM

## 2022-09-26 PROCEDURE — 96374 THER/PROPH/DIAG INJ IV PUSH: CPT

## 2022-09-26 PROCEDURE — 80053 COMPREHEN METABOLIC PANEL: CPT | Performed by: EMERGENCY MEDICINE

## 2022-09-26 PROCEDURE — 85025 COMPLETE CBC W/AUTO DIFF WBC: CPT | Performed by: EMERGENCY MEDICINE

## 2022-09-26 PROCEDURE — 20610 DRAIN/INJ JOINT/BURSA W/O US: CPT

## 2022-09-26 PROCEDURE — 85730 THROMBOPLASTIN TIME PARTIAL: CPT | Performed by: EMERGENCY MEDICINE

## 2022-09-26 PROCEDURE — 36415 COLL VENOUS BLD VENIPUNCTURE: CPT | Performed by: EMERGENCY MEDICINE

## 2022-09-26 PROCEDURE — 85610 PROTHROMBIN TIME: CPT | Performed by: EMERGENCY MEDICINE

## 2022-09-26 PROCEDURE — 99285 EMERGENCY DEPT VISIT HI MDM: CPT | Mod: 25

## 2022-09-27 VITALS
HEIGHT: 62 IN | SYSTOLIC BLOOD PRESSURE: 124 MMHG | RESPIRATION RATE: 20 BRPM | OXYGEN SATURATION: 98 % | WEIGHT: 127 LBS | DIASTOLIC BLOOD PRESSURE: 54 MMHG | TEMPERATURE: 98 F | HEART RATE: 63 BPM | BODY MASS INDEX: 23.37 KG/M2

## 2022-09-27 LAB
ALBUMIN SERPL-MCNC: 3.2 GM/DL (ref 3.4–4.8)
ALBUMIN/GLOB SERPL: 1.1 RATIO (ref 1.1–2)
ALP SERPL-CCNC: 74 UNIT/L (ref 40–150)
ALT SERPL-CCNC: 14 UNIT/L (ref 0–55)
APTT PPP: 31.6 SECONDS (ref 23.2–33.7)
AST SERPL-CCNC: 10 UNIT/L (ref 5–34)
BASOPHILS # BLD AUTO: 0.05 X10(3)/MCL (ref 0–0.2)
BASOPHILS NFR BLD AUTO: 0.4 %
BILIRUBIN DIRECT+TOT PNL SERPL-MCNC: 0.4 MG/DL
BUN SERPL-MCNC: 16.1 MG/DL (ref 9.8–20.1)
CALCIUM SERPL-MCNC: 9 MG/DL (ref 8.4–10.2)
CHLORIDE SERPL-SCNC: 98 MMOL/L (ref 98–107)
CO2 SERPL-SCNC: 25 MMOL/L (ref 23–31)
CREAT SERPL-MCNC: 1.14 MG/DL (ref 0.55–1.02)
EOSINOPHIL # BLD AUTO: 0.1 X10(3)/MCL (ref 0–0.9)
EOSINOPHIL NFR BLD AUTO: 0.9 %
ERYTHROCYTE [DISTWIDTH] IN BLOOD BY AUTOMATED COUNT: 13.2 % (ref 11.5–17)
GFR SERPLBLD CREATININE-BSD FMLA CKD-EPI: 47 MLS/MIN/1.73/M2
GLOBULIN SER-MCNC: 2.9 GM/DL (ref 2.4–3.5)
GLUCOSE SERPL-MCNC: 336 MG/DL (ref 82–115)
HCT VFR BLD AUTO: 37.6 % (ref 37–47)
HGB BLD-MCNC: 12.4 GM/DL (ref 12–16)
IMM GRANULOCYTES # BLD AUTO: 0.07 X10(3)/MCL (ref 0–0.04)
IMM GRANULOCYTES NFR BLD AUTO: 0.6 %
INR BLD: 1.02 (ref 0–1.3)
LYMPHOCYTES # BLD AUTO: 1.95 X10(3)/MCL (ref 0.6–4.6)
LYMPHOCYTES NFR BLD AUTO: 16.9 %
MCH RBC QN AUTO: 31.2 PG (ref 27–31)
MCHC RBC AUTO-ENTMCNC: 33 MG/DL (ref 33–36)
MCV RBC AUTO: 94.7 FL (ref 80–94)
MONOCYTES # BLD AUTO: 0.65 X10(3)/MCL (ref 0.1–1.3)
MONOCYTES NFR BLD AUTO: 5.6 %
NEUTROPHILS # BLD AUTO: 8.7 X10(3)/MCL (ref 2.1–9.2)
NEUTROPHILS NFR BLD AUTO: 75.6 %
NRBC BLD AUTO-RTO: 0 %
PLATELET # BLD AUTO: 326 X10(3)/MCL (ref 130–400)
PMV BLD AUTO: 9.6 FL (ref 7.4–10.4)
POTASSIUM SERPL-SCNC: 4.2 MMOL/L (ref 3.5–5.1)
PROT SERPL-MCNC: 6.1 GM/DL (ref 5.8–7.6)
PROTHROMBIN TIME: 13.3 SECONDS (ref 12.5–14.5)
RBC # BLD AUTO: 3.97 X10(6)/MCL (ref 4.2–5.4)
SODIUM SERPL-SCNC: 135 MMOL/L (ref 136–145)
WBC # SPEC AUTO: 11.5 X10(3)/MCL (ref 4.5–11.5)

## 2022-09-27 PROCEDURE — 20610 DRAIN/INJ JOINT/BURSA W/O US: CPT | Mod: LT

## 2022-09-27 PROCEDURE — 63600175 PHARM REV CODE 636 W HCPCS: Performed by: EMERGENCY MEDICINE

## 2022-09-27 RX ORDER — TRAMADOL HYDROCHLORIDE 50 MG/1
50 TABLET ORAL EVERY 8 HOURS PRN
Qty: 30 TABLET | Refills: 0 | Status: SHIPPED | OUTPATIENT
Start: 2022-09-27 | End: 2022-10-07

## 2022-09-27 RX ORDER — FENTANYL CITRATE 50 UG/ML
50 INJECTION, SOLUTION INTRAMUSCULAR; INTRAVENOUS
Status: COMPLETED | OUTPATIENT
Start: 2022-09-27 | End: 2022-09-27

## 2022-09-27 RX ADMIN — FENTANYL CITRATE 50 MCG: 50 INJECTION, SOLUTION INTRAMUSCULAR; INTRAVENOUS at 04:09

## 2022-09-27 NOTE — ED PROVIDER NOTES
"Encounter Date: 9/26/2022    SCRIBE #1 NOTE: I, Jesus Alberto Mejias, am scribing for, and in the presence of,  Monica Locke MD. I have scribed the following portions of the note - Other sections scribed: HPI, ROS, PE.     History     Chief Complaint   Patient presents with    Fall     Pt arrives via [facility], EMS reports that the pt is coming from [facility], NH staff report that the pt had a "controlled fall" today, meaning the lowered her to the floor. Pt c/o arm and leg pain, pt Left arm xray'd and showed a possible humerous fx. Left leg appears shortened and rotated. - Blood thinners. Pt has hx of stroke baseline GCS of 14.      87 year old female with past medical history of CVA with left sided deficits presents to ED via EMS from nursing home following a fall earlier today. Pt reports pain in her left arm and lower extremities. Per medical record pt had a witnessed fall in the shower without LOC. Per nursing home, the fall was "controlled" e.g. she was lowered by a NH staff member who allegedly witnessed fall and and pt did not have any head trauma.     The history is provided by the patient and the nursing home. No  was used.   Fall  The accident occurred today. The fall occurred while standing. Distance fallen: ground. She landed on A hard floor. The point of impact was the buttocks (back). Pain location: left arm, lower extremities. Pertinent negatives include no fever, no abdominal pain, no nausea, no vomiting, no hematuria, no headaches and no loss of consciousness. She has tried nothing for the symptoms.   Review of patient's allergies indicates:   Allergen Reactions    Naproxen (bulk)     Penicillins     Sulfa (sulfonamide antibiotics)      Past Medical History:   Diagnosis Date    HTN (hypertension)      History reviewed. No pertinent surgical history.  History reviewed. No pertinent family history.     Review of Systems   Constitutional:  Negative for fatigue and fever. "   Eyes:  Negative for visual disturbance.   Respiratory:  Negative for chest tightness and shortness of breath.    Cardiovascular:  Negative for chest pain.   Gastrointestinal:  Negative for abdominal pain, diarrhea, nausea and vomiting.   Genitourinary:  Negative for difficulty urinating, dysuria, frequency and hematuria.   Musculoskeletal:         Lower extremity pain, left arm pain   Skin:  Negative for rash.   Allergic/Immunologic: Negative for immunocompromised state.   Neurological:  Negative for loss of consciousness and headaches.   All other systems reviewed and are negative.    Physical Exam     Initial Vitals [09/26/22 2232]   BP Pulse Resp Temp SpO2   (!) 151/61 71 14 97.5 °F (36.4 °C) 98 %      MAP       --         Physical Exam    Constitutional: She appears well-developed and well-nourished. No distress.   HENT:   Head: Normocephalic and atraumatic.   Mouth/Throat: Oropharynx is clear and moist.   Eyes: EOM are normal. Pupils are equal, round, and reactive to light.   Neck: Neck supple.   Normal range of motion.  Cardiovascular:  Normal rate, regular rhythm and normal heart sounds.           No murmur heard.  Pulses:       Dorsalis pedis pulses are 2+ on the right side and 2+ on the left side.   Pulmonary/Chest: Breath sounds normal. No respiratory distress.   Abdominal: Abdomen is soft. She exhibits no distension. There is no abdominal tenderness.   Musculoskeletal:         General: Tenderness (Left humerus) present.      Cervical back: Normal range of motion and neck supple.      Comments: Shortened externally rotated left lower extremity     Neurological: She is alert.   Skin: Skin is warm and dry. No rash noted.   Psychiatric: She has a normal mood and affect.       ED Course   Arthrocentesis    Date/Time: 9/27/2022 4:52 AM  Location procedure was performed: [facility] EMERGENCY DEPARTMENT  Performed by: Monica Locke MD  Authorized by: Monica Locke MD   Consent Done: Yes  Consent: Verbal consent  "obtained.  Risks and benefits: risks, benefits and alternatives were discussed  Consent given by: patient  Patient understanding: patient states understanding of the procedure being performed  Patient consent: the patient's understanding of the procedure matches consent given  Procedure consent: procedure consent matches procedure scheduled  Patient identity confirmed: , name and verbally with patient  Time out: Immediately prior to procedure a "time out" was called to verify the correct patient, procedure, equipment, support staff and site/side marked as required.  Indications: pain   Body area: knee  Joint: left knee  Local anesthesia used: yes  Anesthesia: local infiltration    Anesthesia:  Local anesthesia used: yes  Local Anesthetic: bupivacaine 0.25% with epinephrine  Anesthetic total: 2 mL    Patient sedated: no  Preparation: Patient was prepped and draped in the usual sterile fashion.  Needle size: 18 G  Approach: lateral  Aspirate amount: 40 mL  Aspirate: bloody  Complications: No  Specimens: No  Implants: No      Labs Reviewed   COMPREHENSIVE METABOLIC PANEL - Abnormal; Notable for the following components:       Result Value    Sodium Level 135 (*)     Glucose Level 336 (*)     Creatinine 1.14 (*)     Albumin Level 3.2 (*)     All other components within normal limits   CBC WITH DIFFERENTIAL - Abnormal; Notable for the following components:    RBC 3.97 (*)     MCV 94.7 (*)     MCH 31.2 (*)     IG# 0.07 (*)     All other components within normal limits   PROTIME-INR - Normal   APTT - Normal   CBC W/ AUTO DIFFERENTIAL    Narrative:     The following orders were created for panel order CBC auto differential.  Procedure                               Abnormality         Status                     ---------                               -----------         ------                     CBC with Differential[[id number]]        Abnormal            Final result                 Please view results for these tests on " the individual orders.          Imaging Results               CTA Lower Extremity Left (Final result)  Result time 09/27/22 10:39:07      Final result by Moody Vazquez MD (09/27/22 10:39:07)                   Narrative:    EXAMINATION  CTA LOWER EXTREMITY LEFT    CLINICAL HISTORY  Lower leg trauma, nondiagnostic xray; recent fall with pain and swelling    TECHNIQUE  Pre- and post-contrast helical-acquisition CT images of the left lower extremity were obtained, contrast injection timed to coincide with arterial opacification for purposes of CT angiography.  Multiplanar reconstructions, to include MIP and volume rendered (3-D), were accomplished by a CT technologist at a separate workstation, pushed to PACS for physician review.    CONTRAST  IV: ISOVUE-370, 100 mL    COMPARISON  No prior cross-sectional imaging is available at the time of initial interpretation.  Pertinent radiographs of the left thigh and leg obtained earlier the same day were reviewed.    FINDINGS  *Images were reviewed in soft tissue and bone windows.  *Exam quality: adequate for interpretation  Soft Tissues: There is mild diffuse subcutaneous fat stranding throughout the left lower extremity, which may reflect regional edema with possibility of widespread contusion or sequela of cellulitis not excluded.  There is mild diffuse atrophy of the left lower extremity and severe atrophy of the gluteal muscles.  No expansile mass-like lesion or drainable collection is identified.    Bones: The bones are severely osteopenic.  Degree of bone demineralization limits sensitivity for detection of subtle, nondisplaced fractures.  However, there is focal cortical contour irregularity at the medial tibial metaphysis, concerning for tibial plateau fracture (series 10, images 36-44).  No other convincing acute osseous irregularity is identified.  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  Moderate to large volume knee effusion is present,  with associated fat-fluid level indicative of lipohemarthrosis.    Iliac Arteries: There is mild atheromatous calcification of the left external iliac artery. There is severe focal stenosis of the left distal external iliac artery at the level of the inguinal ligament secondary to dense calcified atheromatous plaque.    Left Lower Extremity Arteries: No convincing evidence of acute vascular injury is appreciated. There is moderate stenosis of the left common femoral artery at its origin secondary to atheromatous plaque. The deep femoral artery is patent. There is abrupt contrast cut off consistent with proximal occlusion of the left superficial femoral artery, and subsequent distal reconstitution of contrast flow at the level of the popliteal fossa.  Just beyond the level of contrast reconstitution, there is severe focal stenosis of the distal superficial femoral artery secondary to calcified atheromatous plaque. The popliteal artery is patent. The left anterior tibial artery is minimally opacified, but appears widely patent. There is complete occlusion of the left posterior tibial and peroneal arteries secondary to severe atheromatous disease.    Other Findings: Nondistended urinary bladder; however, the urinary bladder wall appears thickened after considering the state of nondistention and element of cystitis is not excluded. There are widespread colonic diverticula without evidence of acute diverticulitis through the visualized region.  The rectosigmoid colon is moderately distended with stool and reflects fecal impaction.    IMPRESSION  1. Severe left lower extremity atherosclerotic disease with diffuse left SFA occlusion and reconstitution at the level of the popliteal fossa, occlusion of the left peroneal and posterior tibial arteries, and additional sites of moderate to severe stenosis through the extremity.  2. No convincing evidence of traumatic vascular injury.  3. Severely limited osseous assessment  "secondary to degree of osteopenia, with suspected subtle nondisplaced medial tibial plateau fracture and associated lipohemarthrosis.  4. Limited evaluation of the urinary bladder but somewhat prominent mural appearance, possibility of cystitis not excluded.  5. Additional nonacute secondary details discussed above.  ==========    Please note discrepancy with teleradiology preliminary report, with regard to suspected tibial plateau fracture; additional details provided above.    Notification of the discrepancy provided via "[facility] Minor Discrepancy" flagging in Epic (9/27/2022, 10:37).    This report was flagged in Epic as abnormal.    RADIATION DOSE  Automated tube current modulation, weight-based exposure dosing, and/or iterative reconstruction technique utilized to reach lowest reasonably achievable exposure rate.    DLP: 530 mGy*cm      Electronically signed by: Moody Vazquez  Date:    09/27/2022  Time:    10:39                      Preliminary result by Moody Vazquez MD (09/27/22 03:07:49)                   Narrative:    START OF REPORT:  Technique: CT angiogram of the left lower extremity was performed without and with intravenous contrast with direct axial, sagittal, and, coronal images.    History: Fall; Lt knee pain/ swelling, r/o vascular injury.    Findings: There is mild diffuse subcutaneous fat stranding in the left lower extremity, which may reflect cellulitis if not contusion. There is moderate lipohemarthrosis in the left knee suggests intra-articular bony or ligamentous injury.  Bones:  Lower extremity bony structures: The bones are severely osteopenic. No acute fracture, subluxation or dislocation is identified. There is moderate osteoarthrosis of the left knee joint. There is mild diffuse atrophy of the left lower extremity and severe atrophy of the gluteal muscles.  Iliac Arteries:  Left Common Iliac Artery: There is mild atheromatous calcification of the left external iliac artery. There is " severe focal stenosis of the left distal external iliac artery at the groin secondary to dense calcified atheromatous plaque.  Left Lower extremity arteries: No acute vascular injury is appreciated.  Superficial femoral artery: There is moderate stenosis of the left common femoral artery at its origin secondary to atheromatous plaque. The deep femoral artery is patent. There is occlusion of the left superficial femoral artery with reconstitution in its distal segment, in the popliteal fossa. There is severe focal stenosis of the distal superficial femoral artery secondary to calcified atheromatous plaque.  Popliteal artery: The popliteal artery is patent.  Trifurcation vessels:  Anterior tibial artery: The left anterior tibial artery is patent and faintly opacified.  Peroneal artery: There is complete occlusion of the left posterior tibial artery and peroneal arteries secondary to severe atheromatous disease.    Miscellaneous: Nondistended urinary bladder; however, the urinary bladder wall appears thickened after considering the state of nondistention. An element of cystitis is not excluded. There are multiple colonic diverticula without evidence of diverticulitis to the extent visualized. The rectosigmoid colon is moderately distended with stool and reflects fecal impaction.      Impression:  1. There is occlusion of the left superficial femoral artery with reconstitution in its distal segment, in the popliteal fossa. There is severe focal stenosis of the distal superficial femoral artery secondary to calcified atheromatous plaque.  2. There is complete occlusion of the left posterior tibial artery and peroneal arteries secondary to severe atheromatous disease.  3. No acute fracture, subluxation or dislocation is identified.  4. Nondistended urinary bladder; however, the urinary bladder wall appears thickened after considering the state of nondistention. An element of cystitis is not excluded. Correlate with  clinical and laboratory findings. The rectosigmoid colon is moderately distended with stool and reflects fecal impaction.  5. There is mild diffuse subcutaneous fat stranding in the left lower extremity, which may reflect cellulitis if not contusion. There is moderate lipohemarthrosis in the left knee suggests intra-articular bony or ligamentous injury.  6. No acute vascular injury is appreciated.  7. Details as above.                          Preliminary result by Moody Vazquez MD (09/27/22 03:07:49)                   Narrative:    START OF REPORT:  Technique: CT angiogram of the left lower extremity was performed without and with intravenous contrast with direct axial, sagittal, and, coronal images.    History: Fall; Lt knee pain/ swelling, r/o vascular injury.    Findings: There is mild diffuse subcutaneous fat stranding in the left lower extremity, which may reflect cellulitis if not contusion. There is moderate lipohemarthrosis in the left knee suggests intra-articular bony or ligamentous injury.  Bones:  Lower extremity bony structures: The bones are severely osteopenic. No acute fracture, subluxation or dislocation is identified. There is moderate osteoarthrosis of the left knee joint. There is mild diffuse atrophy of the left lower extremity and severe atrophy of the gluteal muscles.  Iliac Arteries:  Left Common Iliac Artery: There is mild atheromatous calcification of the left external iliac artery. There is severe focal stenosis of the left distal external iliac artery at the groin secondary to dense calcified atheromatous plaque.  Left Lower extremity arteries: No acute vascular injury is appreciated.  Superficial femoral artery: There is moderate stenosis of the left common femoral artery at its origin secondary to atheromatous plaque. The deep femoral artery is patent. There is occlusion of the left superficial femoral artery with reconstitution in its distal segment, in the popliteal fossa. There is  severe focal stenosis of the distal superficial femoral artery secondary to calcified atheromatous plaque.  Popliteal artery: The popliteal artery is patent.  Trifurcation vessels:  Anterior tibial artery: The left anterior tibial artery is patent and faintly opacified.  Peroneal artery: There is complete occlusion of the left posterior tibial artery and peroneal arteries secondary to severe atheromatous disease.    Miscellaneous: Nondistended urinary bladder; however, the urinary bladder wall appears thickened after considering the state of nondistention. An element of cystitis is not excluded. There are multiple colonic diverticula without evidence of diverticulitis to the extent visualized. The rectosigmoid colon is moderately distended with stool and reflects fecal impaction.      Impression:  1. There is occlusion of the left superficial femoral artery with reconstitution in its distal segment, in the popliteal fossa. There is severe focal stenosis of the distal superficial femoral artery secondary to calcified atheromatous plaque.  2. There is complete occlusion of the left posterior tibial artery and peroneal arteries secondary to severe atheromatous disease.  3. No acute fracture, subluxation or dislocation is identified.  4. Nondistended urinary bladder; however, the urinary bladder wall appears thickened after considering the state of nondistention. An element of cystitis is not excluded. Correlate with clinical and laboratory findings. The rectosigmoid colon is moderately distended with stool and reflects fecal impaction.  5. There is mild diffuse subcutaneous fat stranding in the left lower extremity, which may reflect cellulitis if not contusion. There is moderate lipohemarthrosis in the left knee suggests intra-articular bony or ligamentous injury.  6. No acute vascular injury is appreciated.  7. Details as above.                                         X-Ray Femur Ap/Lat Left (Final result)  Result  time 09/27/22 08:58:08      Final result by Benjamin Chin MD (09/27/22 08:58:08)                   Impression:      Degenerative changes      Electronically signed by: Benjamin Chin  Date:    09/27/2022  Time:    08:58               Narrative:    EXAMINATION:  XR FEMUR 2 VIEW LEFT    CLINICAL HISTORY:  Unspecified fall, initial encounter    COMPARISON:  None.    FINDINGS:  There is demineralization of the visualized osseous structures commensurate with patient's age    No acute displaced fractures or dislocations.    There are degenerative changes of the inferior medial aspect in superolateral aspect of the hip joint with significant degenerative changes of the knee articular spaces otherwise preserved with smooth articular surfaces    No blastic or lytic lesions.    Soft tissues within normal limits.                                        X-Ray Tibia Fibula 2 View Left (Final result)  Result time 09/27/22 10:19:03      Final result by Maame Clements MD (09/27/22 10:19:03)                   Impression:      Medial tibial plateau fracture    This report was flagged in Epic as abnormal.      Electronically signed by: Maame Clements  Date:    09/27/2022  Time:    10:19               Narrative:    EXAMINATION:  XR TIBIA FIBULA 2 VIEW LEFT    CLINICAL HISTORY:  Unspecified fall, initial encounter    TECHNIQUE:  AP and lateral views of the left tibia and fibula were performed.    COMPARISON:  None.    FINDINGS:  The bones are demineralized.  There is a fracture of the medial tibia with sclerotic change at the medial tibial plateau.  There are mottled sclerotic changes at the femoral condyle suggestive of degenerative change or prior bony infarcts.    Regional soft tissues are normal.                                       CT Pelvis Without Contrast (Final result)  Result time 09/27/22 07:59:49      Final result by Ike Pichardo MD (09/27/22 07:59:49)                   Impression:    Impression:    No  acute fracture dislocation or subluxation is seen in the visualized bony structures. Details and other findings as discussed above.    No significant discrepancy with overnight report.      Electronically signed by: Ike Pichardo  Date:    09/27/2022  Time:    07:59               Narrative:      echnique:CT imaging of the pelvis was performed without intravenous contrast with direct axial as well as sagittal and coronal reconstruction images.    Comparison:None.    Clinical history:Fall, lt hip pain.    Findings:    Pelvis:The bony structures of the pelvis are intact. No acute fracture is identified.    Hip:    Right:No fracture dislocation or subluxation is seen involving the visualized right hip bony structures. Mild degenerative changes are identified marginal osteophytes off the femoral and acetabular artcular surfaces. Mild joint space narrowing is seen. Mild subchondral sclerosis of the opposing articular surfaces is seen.    Left:No fracture dislocation or subluxation is seen involving the visualized left hip bony structures. Mild degenerative changes are identified marginal osteophytes off the femoral and acetabular artcular surfaces. Mild joint space narrowing is seen. Mild subchondral sclerosis of the opposing articular surfaces is seen.    Femur:    Proximal Femur:The visualized proximal right and left femurs appear intact.    Pelvic structures:    Bladder:Unremarkable.    Visualized small bowel loops:Normal.    Rectosigmoid colon:There are multiple colonic diverticula without any associated inflammatory stranding to suggest diverticulitis.    Uterus:The uterus is not identified which could reflect prior hysterectomy.    Lymph nodes:No pathologically enlarged nodes.    Osseous structures:Generalized osteopenia is seen.                        Preliminary result by Ike Pichardo MD (09/27/22 00:23:53)                   Narrative:    START OF REPORT:  Technique: CT imaging of the pelvis was performed  without intravenous contrast with direct axial as well as sagittal and coronal reconstruction images.    Comparison: None.    Clinical history: Fall, lt hip pain.    Findings:  Pelvis: The bony structures of the pelvis are intact. No acute fracture is identified.  Hip:  Right: No fracture dislocation or subluxation is seen involving the visualized right hip bony structures. Mild degenerative changes are identified marginal osteophytes off the femoral and acetabular artcular surfaces. Mild joint space narrowing is seen. Mild subchondral sclerosis of the opposing articular surfaces is seen.  Left: No fracture dislocation or subluxation is seen involving the visualized left hip bony structures. Mild degenerative changes are identified marginal osteophytes off the femoral and acetabular artcular surfaces. Mild joint space narrowing is seen. Mild subchondral sclerosis of the opposing articular surfaces is seen.  Femur:  Proximal Femur: The visualized proximal right and left femurs appear intact.  Pelvic structures:  Bladder: Unremarkable.  Visualized small bowel loops: Normal.  Rectosigmoid colon: There are multiple colonic diverticula without any associated inflammatory stranding to suggest diverticulitis.  Uterus: The uterus is not identified which could reflect prior hysterectomy.  Adnexa: Normal.  Lymph nodes: No pathologically enlarged nodes.  Pelvic cavity: Normal.  Pelvic wall: Normal.  Osseous structures: Generalized osteopenia is seen.      Impression:  1. No acute fracture dislocation or subluxation is seen in the visualized bony structures. Details and other findings as discussed above.                          Preliminary result by Adrien Crockett Jr., MD (09/27/22 00:23:53)                   Narrative:    START OF REPORT:  Technique: CT imaging of the pelvis was performed without intravenous contrast with direct axial as well as sagittal and coronal reconstruction images.    Comparison:  None.    Clinical history: Fall, lt hip pain.    Findings:  Pelvis: The bony structures of the pelvis are intact. No acute fracture is identified.  Hip:  Right: No fracture dislocation or subluxation is seen involving the visualized right hip bony structures. Mild degenerative changes are identified marginal osteophytes off the femoral and acetabular artcular surfaces. Mild joint space narrowing is seen. Mild subchondral sclerosis of the opposing articular surfaces is seen.  Left: No fracture dislocation or subluxation is seen involving the visualized left hip bony structures. Mild degenerative changes are identified marginal osteophytes off the femoral and acetabular artcular surfaces. Mild joint space narrowing is seen. Mild subchondral sclerosis of the opposing articular surfaces is seen.  Femur:  Proximal Femur: The visualized proximal right and left femurs appear intact.  Pelvic structures:  Bladder: Unremarkable.  Visualized small bowel loops: Normal.  Rectosigmoid colon: There are multiple colonic diverticula without any associated inflammatory stranding to suggest diverticulitis.  Uterus: The uterus is not identified which could reflect prior hysterectomy.  Adnexa: Normal.  Lymph nodes: No pathologically enlarged nodes.  Pelvic cavity: Normal.  Pelvic wall: Normal.  Osseous structures: Generalized osteopenia is seen.      Impression:  1. No acute fracture dislocation or subluxation is seen in the visualized bony structures. Details and other findings as discussed above.                                         CT Head Without Contrast (Final result)  Result time 09/27/22 06:37:42      Final result by Angela Bansal MD (09/27/22 06:37:42)                   Impression:      1. No acute intracranial abnormality.  2. Chronic microvascular ischemic changes.  No significant change from the [facility] interpretation.      Electronically signed by: Angela Bansal  Date:    09/27/2022  Time:    06:37                Narrative:    EXAMINATION:  CT HEAD WITHOUT CONTRAST    CLINICAL HISTORY:  Facial trauma, blunt;    TECHNIQUE:  Axial scans were obtained from skull base to the vertex.    Coronal and sagittal reconstructions obtained from the axial data.    Automatic exposure control was utilized to limit radiation dose.    Contrast: None    Radiation Dose:    Total DLP: 897 mGy*cm    COMPARISON:  CT head dated 07/30/2022    FINDINGS:  There is no acute intracranial hemorrhage or edema.  There are bone small prior infarcts in the basal ganglia.  Scattered hypodensities in the subcortical and periventricular white matter likely represent chronic microvascular ischemic changes.    There is no mass effect or midline shift.  There is diffuse parenchymal volume loss.  The basal cisterns are patent. There is no abnormal extra-axial fluid collection.  Carotid artery calcifications are noted.    The calvarium and skull base are intact.  There is a small volume of fluid layering in the paranasal sinuses.                        Preliminary result by Angela Bansal MD (09/27/22 00:23:22)                   Narrative:    START OF REPORT:  Technique: CT of the head was performed without intravenous contrast with axial as well as coronal and sagittal images.    Comparison: None.    Dosage Information: Automated exposure control was utilized.    Clinical history: Fall, no signs of trauma to head.    Findings:  Hemorrhage: No acute intracranial hemorrhage is seen.  CSF spaces: The ventricles, sulci and basal cisterns all appear moderately prominent consistent with global cerebral atrophy.  Brain parenchyma: No acute infarct is identified. Mild microvascular change is seen in portions of the periventricular and deep white matter tracts.  Cerebellum: Unremarkable.  Vascular: Unremarkable venous sinuses. Mild atheromatous calcification of the intracranial arteries is seen.  Sella and skull base: The sella appears to be within normal limits  for age.  Intracranial calcifications: Incidental note is made of bilateral choroid plexus calcification. Incidental note is made of some pineal region calcification. Incidental note is made of some calcification of the falx and tentorium bilaterally. Incidental note is made of subtle left basal ganglia calcifcation.  Calvarium: No acute linear or depressed skull fracture is seen.    Maxillofacial Structures:  Paranasal sinuses: There is some opacity in the bilateral sphenoid sinuses. This is consistent with acute sinusitis. The rest of the paranasal sinuses appear clear.  Orbits: The orbits appear unremarkable.  Zygomatic arches: The zygomatic arches are intact and unremarkable to the extent visualised.  Temporal bones and mastoids: The temporal bones and mastoids appear unremarkable.  TMJ: The mandibular condyles appear normally placed with respect to the mandibular fossa.  Nasal Bones: The nasal septum is midline.    Visualized upper cervical spine: The visualized cervical spine appears unremarkable.      Impression:  1. No acute intracranial traumatic injury identified. Details and other findings as noted above.                          Preliminary result by Adrien Crockett Jr., MD (09/27/22 00:23:22)                   Narrative:    START OF REPORT:  Technique: CT of the head was performed without intravenous contrast with axial as well as coronal and sagittal images.    Comparison: None.    Dosage Information: Automated exposure control was utilized.    Clinical history: Fall, no signs of trauma to head.    Findings:  Hemorrhage: No acute intracranial hemorrhage is seen.  CSF spaces: The ventricles, sulci and basal cisterns all appear moderately prominent consistent with global cerebral atrophy.  Brain parenchyma: No acute infarct is identified. Mild microvascular change is seen in portions of the periventricular and deep white matter tracts.  Cerebellum: Unremarkable.  Vascular: Unremarkable venous  sinuses. Mild atheromatous calcification of the intracranial arteries is seen.  Sella and skull base: The sella appears to be within normal limits for age.  Intracranial calcifications: Incidental note is made of bilateral choroid plexus calcification. Incidental note is made of some pineal region calcification. Incidental note is made of some calcification of the falx and tentorium bilaterally. Incidental note is made of subtle left basal ganglia calcifcation.  Calvarium: No acute linear or depressed skull fracture is seen.    Maxillofacial Structures:  Paranasal sinuses: There is some opacity in the bilateral sphenoid sinuses. This is consistent with acute sinusitis. The rest of the paranasal sinuses appear clear.  Orbits: The orbits appear unremarkable.  Zygomatic arches: The zygomatic arches are intact and unremarkable to the extent visualised.  Temporal bones and mastoids: The temporal bones and mastoids appear unremarkable.  TMJ: The mandibular condyles appear normally placed with respect to the mandibular fossa.  Nasal Bones: The nasal septum is midline.    Visualized upper cervical spine: The visualized cervical spine appears unremarkable.      Impression:  1. No acute intracranial traumatic injury identified. Details and other findings as noted above.                                         X-Ray Chest 1 View (Final result)  Result time 09/27/22 07:26:57      Final result by Tom Aldana MD (09/27/22 07:26:57)                   Impression:      Prominent interstitial markings most notably at the left base. Developing infectious process is not excluded.  Recommend two views of the chest is this may be obscured by motion.      Electronically signed by: Tom Aldana  Date:    09/27/2022  Time:    07:26               Narrative:    EXAMINATION:  XR CHEST 1 VIEW    CLINICAL HISTORY:  Encounter for other preprocedural examination    TECHNIQUE:  Single view of the  chest    COMPARISON:  07/30/2022    FINDINGS:  Prominent interstitial markings most notably at the left base.  Developing infectious process is not excluded.  Recommend two views of the chest is this may be obscured by motion.    The cardiomediastinal silhouette is within normal limits.    No acute osseous abnormality.                                       X-Ray Humerus 2 View Left (Final result)  Result time 09/27/22 09:22:28      Final result by Benjamin Chin MD (09/27/22 09:22:28)                   Impression:      Fracture as above.      Electronically signed by: Benjamin Chin  Date:    09/27/2022  Time:    09:22               Narrative:    EXAMINATION:  XR HUMERUS 2 VIEW LEFT    CLINICAL HISTORY:  Unspecified fall, initial encounter    COMPARISON:  None.    FINDINGS:  There is demineralization of the visualized osseous structures spiral displaced fracture identified of the proximal humerus at the surgical neck with no other definite fractures or dislocations identified    No acute displaced fractures or dislocations.    Joint spaces preserved.    No blastic or lytic lesions.    Soft tissues within normal limits.                                       X-Ray Hip 2 or 3 views Left (with Pelvis when performed) (Final result)  Result time 09/27/22 09:20:36      Final result by Benjamin Chin MD (09/27/22 09:20:36)                   Impression:      Degenerative changes.      Electronically signed by: Benjamin Chin  Date:    09/27/2022  Time:    09:20               Narrative:    EXAMINATION:  XR HIP WITH PELVIS WHEN PERFORMED, 2 OR 3 VIEWS LEFT    CLINICAL HISTORY:  Unspecified fall, initial encounter    COMPARISON:  None.    FINDINGS:  No acute displaced fractures or dislocations.    There are degenerative changes with narrowing of the inferior medial aspect and superolateral aspect of the hip joint articular spaces are otherwise preserved with smooth articular surfaces there are also degenerative  changes of the inferior medial aspect of the right hip joint no definite fractures or dislocations are identified    No blastic or lytic lesions.    Soft tissues within normal limits.                                       RADIOLOGY REPORT (Final result)  Result time 10/03/22 11:06:31      Final result by Unknown User (10/03/22 11:06:31)                                         Medications   fentaNYL injection 50 mcg (50 mcg Intravenous Given 9/27/22 0429)     Medical Decision Making:   Initial Assessment:   Ms. Cornelius presented w/ fall at her nursing home, L arm and L leg pain. + swelling to the left knee. Will obtain Xrays, analgesics provided.   Differential Diagnosis:   Left humerus fracture, left hip fracture, left tibia fracture, left knee dislocation, left popliteal artery injury, left knee effusion  Clinical Tests:   Lab Tests: Ordered and Reviewed  Radiological Study: Ordered and Reviewed  ED Management:  Xrs w/ humerus fracture, possible tibial plateau fx. Given amount of swelling, CT angiogram ordered to r/o vascular injury - noted extensive chronic vascular disease, no traumatic injuries. Discussed vascular findings w/ vascular surgery on call, Dr. Ramirez, who advised no intervention needed at this time. May follow up in clinic if desired. Therapeutic arthrocentesis performed to help w/ pain control related to lipohemarthrosis.  Should wear knee immobilizer when moving around. Sling provided for humerus fx. Will provide analgesics on ED discharge.  I informed the patient of the risks of opioid use and the option to fill fewer than prescribed amount. ED return precautions reviewed at the bedside and provided in the written discharge instructions. All questions answered to the best of my ability. She should follow up with orthopedics and vascular surgery.   Other:   I have discussed this case with another health care provider.        Scribe Attestation:   Scribe #1: I performed the above scribed service and  the documentation accurately describes the services I performed. I attest to the accuracy of the note.    Attending Attestation:           Physician Attestation for Scribe:  Physician Attestation Statement for Scribe #1: I, Monica Locke MD, reviewed documentation, as scribed by Jesus Alberto Mejias in my presence, and it is both accurate and complete.           ED Course as of 10/06/22 0840   Tue Sep 27, 2022   0414 Imaging findings discussed with Dr. Tejas Kohler, on call for ortho, who reviewed the images and advised possible nondisplaced cortical disruption on the knee; however, no intervention needed. Sling to the LUE for humerus fx. Ok to DC home, no surgical intervention needed. Can follow up outpatient PRN.  [KS]   0451 Discussed angiography findings with Dr. Ramirez, on call for peripheral vascular surgery, who advised no inpt treatment needed at this time. May follow up PRN but likely incidental finding. She is already on plavix at home.  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 Provider    traMADoL (ULTRAM) 50 mg tablet Take 1 tablet (50 mg total) by mouth every 8 (eight) hours as needed for Pain. 30 tablet 9/27/2022 10/7/2022 Monica Locke MD          Follow-up Information       Follow up With Specialties Details Why Contact Info    Tejas Kohler MD Orthopedic Surgery  This is the  orthopedist I spoke with about your arm and your knee. You should contact this orthopedist or any other orthopedist of your choice to arrange follow up care. [address] [facility]  [address]  [facility] [id number] [phone]      Rachel Ramirez MD Cardiothoracic Surgery, Cardiology  This is the local vascular surgeon that I discussed your angiogram/blood vessel findings with. You may contact this vascular surgeon or any other surgeon of your choice to arrange follow up evaluation if desired. [address] [place] [address]  [address]  [facility] [id number] [phone]      [facility] - Emergency Dept Emergency Medicine  As needed, If symptoms worsen [address] [facility] [address] [phone]             Monica Locke MD  10/06/22 0852       Monica Locke MD  10/06/22 0821

## 2022-09-27 NOTE — ED NOTES
Pt arrived via ems. Deformity noted to left lower ext.  Pt c/o pain to Left upper arm on assessment.

## 2022-10-04 ENCOUNTER — LAB REQUISITION (OUTPATIENT)
Dept: LAB | Facility: HOSPITAL | Age: 87
End: 2022-10-04
Payer: MEDICARE

## 2022-10-04 DIAGNOSIS — E11.8 TYPE 2 DIABETES MELLITUS WITH UNSPECIFIED COMPLICATIONS: ICD-10-CM

## 2022-10-04 DIAGNOSIS — I12.9 HYPERTENSIVE CHRONIC KIDNEY DISEASE WITH STAGE 1 THROUGH STAGE 4 CHRONIC KIDNEY DISEASE, OR UNSPECIFIED CHRONIC KIDNEY DISEASE: ICD-10-CM

## 2022-10-04 DIAGNOSIS — I73.9 PAD (PERIPHERAL ARTERY DISEASE): Primary | ICD-10-CM

## 2022-10-04 DIAGNOSIS — E55.9 VITAMIN D DEFICIENCY, UNSPECIFIED: ICD-10-CM

## 2022-10-04 LAB
ALBUMIN SERPL-MCNC: 2.6 GM/DL (ref 3.4–4.8)
ALBUMIN/GLOB SERPL: 0.9 RATIO (ref 1.1–2)
ALP SERPL-CCNC: 74 UNIT/L (ref 40–150)
ALT SERPL-CCNC: 13 UNIT/L (ref 0–55)
AST SERPL-CCNC: 13 UNIT/L (ref 5–34)
BILIRUBIN DIRECT+TOT PNL SERPL-MCNC: 0.7 MG/DL
BUN SERPL-MCNC: 15.7 MG/DL (ref 9.8–20.1)
CALCIUM SERPL-MCNC: 8.9 MG/DL (ref 8.4–10.2)
CHLORIDE SERPL-SCNC: 100 MMOL/L (ref 98–107)
CHOLEST SERPL-MCNC: 86 MG/DL
CHOLEST/HDLC SERPL: 4 {RATIO} (ref 0–5)
CO2 SERPL-SCNC: 27 MMOL/L (ref 23–31)
CREAT SERPL-MCNC: 1.01 MG/DL (ref 0.55–1.02)
DEPRECATED CALCIDIOL+CALCIFEROL SERPL-MC: 27.6 NG/ML (ref 30–80)
ERYTHROCYTE [DISTWIDTH] IN BLOOD BY AUTOMATED COUNT: 12.9 % (ref 11.5–17)
EST. AVERAGE GLUCOSE BLD GHB EST-MCNC: 157.1 MG/DL
GFR SERPLBLD CREATININE-BSD FMLA CKD-EPI: 55 MLS/MIN/1.73/M2
GLOBULIN SER-MCNC: 2.9 GM/DL (ref 2.4–3.5)
GLUCOSE SERPL-MCNC: 101 MG/DL (ref 82–115)
HBA1C MFR BLD: 7.1 %
HCT VFR BLD AUTO: 32.2 % (ref 37–47)
HDLC SERPL-MCNC: 24 MG/DL (ref 35–60)
HGB BLD-MCNC: 10.4 GM/DL (ref 12–16)
LDLC SERPL CALC-MCNC: 36 MG/DL (ref 50–140)
MCH RBC QN AUTO: 30.7 PG (ref 27–31)
MCHC RBC AUTO-ENTMCNC: 32.3 MG/DL (ref 33–36)
MCV RBC AUTO: 95 FL (ref 80–94)
NRBC BLD AUTO-RTO: 0 %
PLATELET # BLD AUTO: 389 X10(3)/MCL (ref 130–400)
PMV BLD AUTO: 9.3 FL (ref 7.4–10.4)
POTASSIUM SERPL-SCNC: 4.5 MMOL/L (ref 3.5–5.1)
PROT SERPL-MCNC: 5.5 GM/DL (ref 5.8–7.6)
RBC # BLD AUTO: 3.39 X10(6)/MCL (ref 4.2–5.4)
SODIUM SERPL-SCNC: 137 MMOL/L (ref 136–145)
TRIGL SERPL-MCNC: 131 MG/DL (ref 37–140)
VLDLC SERPL CALC-MCNC: 26 MG/DL
WBC # SPEC AUTO: 8.7 X10(3)/MCL (ref 4.5–11.5)

## 2022-10-04 PROCEDURE — 82306 VITAMIN D 25 HYDROXY: CPT | Performed by: FAMILY MEDICINE

## 2022-10-04 PROCEDURE — 80053 COMPREHEN METABOLIC PANEL: CPT | Performed by: FAMILY MEDICINE

## 2022-10-04 PROCEDURE — 83036 HEMOGLOBIN GLYCOSYLATED A1C: CPT | Performed by: FAMILY MEDICINE

## 2022-10-04 PROCEDURE — 85027 COMPLETE CBC AUTOMATED: CPT | Performed by: FAMILY MEDICINE

## 2022-10-04 PROCEDURE — 80061 LIPID PANEL: CPT | Performed by: FAMILY MEDICINE

## 2022-10-12 ENCOUNTER — OFFICE VISIT (OUTPATIENT)
Dept: ORTHOPEDICS | Facility: CLINIC | Age: 87
End: 2022-10-12
Payer: MEDICARE

## 2022-10-12 ENCOUNTER — HOSPITAL ENCOUNTER (OUTPATIENT)
Dept: RADIOLOGY | Facility: CLINIC | Age: 87
Discharge: HOME | End: 2022-10-12
Attending: REHABILITATION UNIT
Payer: MEDICARE

## 2022-10-12 VITALS — WEIGHT: 127 LBS | BODY MASS INDEX: 23.37 KG/M2 | HEIGHT: 62 IN

## 2022-10-12 DIAGNOSIS — M89.8X2 PAIN OF LEFT HUMERUS: ICD-10-CM

## 2022-10-12 DIAGNOSIS — S42.302A UNSPECIFIED FRACTURE OF SHAFT OF HUMERUS, LEFT ARM, INITIAL ENCOUNTER FOR CLOSED FRACTURE: Primary | ICD-10-CM

## 2022-10-12 PROCEDURE — 99203 OFFICE O/P NEW LOW 30 MIN: CPT | Mod: 57,,, | Performed by: REHABILITATION UNIT

## 2022-10-12 PROCEDURE — 24500 CLTX HUMRL SHFT FX W/O MNPJ: CPT | Mod: LT,,, | Performed by: REHABILITATION UNIT

## 2022-10-12 PROCEDURE — 73060 XR HUMERUS 2 VIEW LEFT: ICD-10-PCS | Mod: LT,,, | Performed by: REHABILITATION UNIT

## 2022-10-12 PROCEDURE — 24500 PR CLOSED RX MID HUMERUS FRACTURE: ICD-10-PCS | Mod: LT,,, | Performed by: REHABILITATION UNIT

## 2022-10-12 PROCEDURE — 73060 X-RAY EXAM OF HUMERUS: CPT | Mod: LT,,, | Performed by: REHABILITATION UNIT

## 2022-10-12 PROCEDURE — 99203 PR OFFICE/OUTPT VISIT, NEW, LEVL III, 30-44 MIN: ICD-10-PCS | Mod: 57,,, | Performed by: REHABILITATION UNIT

## 2022-10-12 NOTE — PROGRESS NOTES
Subjective:      Patient ID: Cleo Cornelius is a 87 y.o. female.    Chief Complaint: Follow-up of the Left Shoulder (Follow up on nondisplaced fracture or proximal end of left humerus. Pt states that the pain is starting at her shoulder and is going down to her elbow. States she is taking pain medication which gives her some relief. )    HPI:   Cleo Cornelius is a 87 y.o. female who presents today for initial evaluation of her left upper extremity.  She lives at an assisted living facility and sustained a controlled fall on 09/26/2022.  She is normally wheelchair bound.  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  She was taken to the emergency department after her fall where radiographs showed a left humerus fracture.  She has been in a sling.  She has pain and swelling about her left upper extremity.  She is taking some pain medication which is helpful.  No changes from her baseline sensory or motor function distally.    Past Medical History:   Diagnosis Date    HTN (hypertension)      History reviewed. No pertinent surgical history.  Social History     Socioeconomic History    Marital status:    Tobacco Use    Smoking status: Every Day     Packs/day: 0.50     Types: Cigarettes    Smokeless tobacco: Never   Substance and Sexual Activity    Alcohol use: Not Currently    Drug use: Never    Sexual activity: Not Currently         Current Outpatient Medications:     amLODIPine (NORVASC) 5 MG tablet, Take 5 mg by mouth once daily., Disp: , Rfl:     atorvastatin (LIPITOR) 40 MG tablet, Take 40 mg by mouth once daily., Disp: , Rfl:     clonazePAM (KLONOPIN) 0.5 MG tablet, Take 0.5 mg by mouth 2 (two) times daily as needed for Anxiety., Disp: , Rfl:     clopidogreL (PLAVIX) 75 mg tablet, Take 75 mg by mouth once daily., Disp: , Rfl:     DULoxetine (CYMBALTA) 60 MG capsule, Take 60 mg by mouth once daily., Disp: , Rfl:     meloxicam (MOBIC) 7.5 MG tablet, Take 7.5 mg by mouth once daily., Disp: ,  "Rfl:     traZODone (DESYREL) 100 MG tablet, Take 100 mg by mouth every evening., Disp: , Rfl:     HYDROcodone-acetaminophen (NORCO) 5-325 mg per tablet, Take 1 tablet by mouth every 6 (six) hours as needed for Pain., Disp: , Rfl:     insulin NPH-insulin regular, 70/30, 100 unit/mL (70-30) injection, Inject into the skin 2 (two) times daily., Disp: , Rfl:     metFORMIN (GLUCOPHAGE) 500 MG tablet, Take 500 mg by mouth 2 (two) times daily with meals., Disp: , Rfl:     SITagliptin phosphate (JANUVIA) 25 MG Tab, Take by mouth once daily., Disp: , Rfl:     sodium bicarbonate 325 MG tablet, Take 325 mg by mouth 4 (four) times daily., Disp: , Rfl:   Review of patient's allergies indicates:   Allergen Reactions    Naproxen (bulk)     Penicillins     Sulfa (sulfonamide antibiotics)        Ht 5' 2" (1.575 m)   Wt 57.6 kg (127 lb)   BMI 23.23 kg/m²     Comprehensive review of systems completed and negative except as per HPI.        Objective:   Head: Normocephalic, without obvious abnormality, atraumatic  Eyes: conjunctivae/corneas clear. EOM's intact  Ears: normal external appearance  Nose: Nares normal. Septum midline. Mucosa normal. No drainage  Throat: normal findings: lips normal without lesions  Lungs: unlabored breathing on room air  Chest wall: symmetric chest rise  Heart: regular rate and rhythm  Pulses: 2+ and symmetric  Skin: Skin color, texture, turgor normal. No rashes or lesions  Neurologic: Grossly normal    left SHOULDER    Appearance:   Moderate swelling and ecchymosis about the upper extremity    Tenderness:   At the fracture site    ROM:   Deferred    Strength:  Motor intact distally    Pulses: Palpable radial pulse    Neurological deficits: None    The patient has a warm and well-perfused upper extremity with capillary refill less than 2 seconds. Sensation is intact to light touch in terminal nerve distributions. The patient has no palpable epitrochlear lymphadenopathy.      Assessment:     Imaging: "   Two-view radiographs of the left humerus obtained today personally reviewed showing acute fracture of the proximal humeral shaft with transverse component at the surgical neck and spiral oblique pattern extending distally to the shaft.  Visualized joint spaces are intact.  Overall alignment is maintained.        1. Unspecified fracture of shaft of humerus, left arm, initial encounter for closed fracture    2. Pain of left humerus          Plan:       Orders Placed This Encounter    X-Ray Humerus 2 View Left      Imaging and exam findings discussed with the patient.  She sustained an acute left humeral shaft fracture after a controlled fall at her living facility.  Overall the alignment is maintained.  We will proceed with nonoperative management in a sling.  I will see her back in 4 weeks for repeat radiographs and clinical exam for her fracture care.  She was instructed to come out of the sling 4-5 times daily to work on motion of her elbow and distally.  All of her questions were answered and she was in agreement.

## 2022-11-02 ENCOUNTER — OFFICE VISIT (OUTPATIENT)
Dept: CARDIAC SURGERY | Facility: CLINIC | Age: 87
End: 2022-11-02
Payer: MEDICARE

## 2022-11-02 VITALS
HEART RATE: 58 BPM | WEIGHT: 123 LBS | BODY MASS INDEX: 22.63 KG/M2 | DIASTOLIC BLOOD PRESSURE: 63 MMHG | SYSTOLIC BLOOD PRESSURE: 141 MMHG | HEIGHT: 62 IN

## 2022-11-02 DIAGNOSIS — I73.9 PAD (PERIPHERAL ARTERY DISEASE): ICD-10-CM

## 2022-11-02 PROCEDURE — 99204 PR OFFICE/OUTPT VISIT, NEW, LEVL IV, 45-59 MIN: ICD-10-PCS | Mod: ,,, | Performed by: THORACIC SURGERY (CARDIOTHORACIC VASCULAR SURGERY)

## 2022-11-02 PROCEDURE — 99204 OFFICE O/P NEW MOD 45 MIN: CPT | Mod: ,,, | Performed by: THORACIC SURGERY (CARDIOTHORACIC VASCULAR SURGERY)

## 2022-11-02 RX ORDER — METFORMIN HYDROCHLORIDE 500 MG/1
500 TABLET ORAL 2 TIMES DAILY WITH MEALS
COMMUNITY

## 2022-11-02 RX ORDER — SODIUM BICARBONATE 325 MG/1
325 TABLET ORAL 4 TIMES DAILY
COMMUNITY

## 2022-11-02 RX ORDER — HYDROCODONE BITARTRATE AND ACETAMINOPHEN 5; 325 MG/1; MG/1
1 TABLET ORAL EVERY 6 HOURS PRN
COMMUNITY

## 2022-11-02 NOTE — PROGRESS NOTES
History & Physical    SUBJECTIVE:     History of Present Illness:  The patient presenting to the clinic status post fall at the nursing home with fracture of left upper extremity and left lower extremity.  CTA was performed for vascular evaluation revealing evidence of disease at the SFA popliteal and infrapopliteal arteries.  She does report left lower extremity pain that starts at her hip and radiates down all the way.  No history of ulcers in the left lower extremity      Chief Complaint   Patient presents with    Pre-op Exam     ER  ref RE: PAD       Review of patient's allergies indicates:   Allergen Reactions    Naproxen (bulk)     Penicillins     Sulfa (sulfonamide antibiotics)        Current Outpatient Medications   Medication Sig Dispense Refill    amLODIPine (NORVASC) 5 MG tablet Take 5 mg by mouth once daily.      atorvastatin (LIPITOR) 40 MG tablet Take 40 mg by mouth once daily.      clonazePAM (KLONOPIN) 0.5 MG tablet Take 0.5 mg by mouth 2 (two) times daily as needed for Anxiety.      clopidogreL (PLAVIX) 75 mg tablet Take 75 mg by mouth once daily.      DULoxetine (CYMBALTA) 60 MG capsule Take 60 mg by mouth once daily.      HYDROcodone-acetaminophen (NORCO) 5-325 mg per tablet Take 1 tablet by mouth every 6 (six) hours as needed for Pain.      insulin NPH-insulin regular, 70/30, (HUMULIN 70/30 U-100 INSULIN) 100 unit/mL (70-30) injection Inject into the skin 2 (two) times daily.      meloxicam (MOBIC) 7.5 MG tablet Take 7.5 mg by mouth once daily.      metFORMIN (GLUCOPHAGE) 500 MG tablet Take 500 mg by mouth 2 (two) times daily with meals.      SITagliptin (JANUVIA) 25 MG Tab Take by mouth once daily.      sodium bicarbonate 325 MG tablet Take 325 mg by mouth 4 (four) times daily.      traZODone (DESYREL) 100 MG tablet Take 100 mg by mouth every evening.       No current facility-administered medications for this visit.       Past Medical History:   Diagnosis Date    HTN (hypertension)      History  "reviewed. No pertinent surgical history.  Family History   Problem Relation Age of Onset    No Known Problems Mother     No Known Problems Father     No Known Problems Sister      Social History     Tobacco Use    Smoking status: Every Day     Packs/day: 0.50     Types: Cigarettes    Smokeless tobacco: Never   Substance Use Topics    Alcohol use: Not Currently    Drug use: Never        Review of Systems:  Review of Systems   Constitutional: Negative.    HENT: Negative.     Eyes: Negative.    Respiratory: Negative.     Cardiovascular: Negative.    Gastrointestinal: Negative.    Endocrine: Negative.    Genitourinary: Negative.    Musculoskeletal:  Positive for gait problem and myalgias.        Claudications   Skin: Negative.    Allergic/Immunologic: Negative.    Neurological:  Positive for weakness.   Hematological: Negative.    Psychiatric/Behavioral: Negative.       OBJECTIVE:     Vital Signs (Most Recent)  Pulse: (!) 58 (11/02/22 0924)  BP: (!) 141/63 (11/02/22 0924)  5' 2" (1.575 m)  55.8 kg (123 lb)     Physical Exam:  Physical Exam  Vitals reviewed.   Constitutional:       Appearance: Normal appearance.   HENT:      Head: Normocephalic and atraumatic.      Nose: Nose normal.      Mouth/Throat:      Mouth: Mucous membranes are dry.      Pharynx: Oropharynx is clear.   Eyes:      Extraocular Movements: Extraocular movements intact.      Conjunctiva/sclera: Conjunctivae normal.      Pupils: Pupils are equal, round, and reactive to light.   Cardiovascular:      Rate and Rhythm: Normal rate and regular rhythm.      Pulses: Normal pulses.   Pulmonary:      Effort: Pulmonary effort is normal.      Breath sounds: Normal breath sounds.   Abdominal:      General: Abdomen is flat.      Palpations: Abdomen is soft.   Musculoskeletal:         General: Normal range of motion.      Cervical back: Neck supple.   Skin:     General: Skin is warm and dry.   Neurological:      General: No focal deficit present.   Psychiatric:       "   Mood and Affect: Mood normal.       Laboratory:  None      Diagnostic Results:  CTA reviewed.      ASSESSMENT/PLAN:     Severe peripheral vascular disease left lower extremity.  At this time the patient does not have any symptoms in need of intervention.  I also believe she would be a poor candidate for any intervention.  I believe conservative treatment is the way to go.  I instructed them to follow-up in case they start having more symptoms in the lower extremity or any nonhealing ulcers

## 2022-11-09 ENCOUNTER — HOSPITAL ENCOUNTER (OUTPATIENT)
Dept: RADIOLOGY | Facility: CLINIC | Age: 87
Discharge: HOME | End: 2022-11-09
Attending: REHABILITATION UNIT
Payer: MEDICARE

## 2022-11-09 ENCOUNTER — OFFICE VISIT (OUTPATIENT)
Dept: ORTHOPEDICS | Facility: CLINIC | Age: 87
End: 2022-11-09
Payer: MEDICARE

## 2022-11-09 VITALS — WEIGHT: 123 LBS | HEIGHT: 62 IN | BODY MASS INDEX: 22.63 KG/M2

## 2022-11-09 DIAGNOSIS — M89.8X2 PAIN OF LEFT HUMERUS: ICD-10-CM

## 2022-11-09 DIAGNOSIS — S42.302D CLOSED FRACTURE OF SHAFT OF LEFT HUMERUS WITH ROUTINE HEALING, UNSPECIFIED FRACTURE MORPHOLOGY, SUBSEQUENT ENCOUNTER: Primary | ICD-10-CM

## 2022-11-09 PROCEDURE — 73060 XR HUMERUS 2 VIEW LEFT: ICD-10-PCS | Mod: LT,,, | Performed by: REHABILITATION UNIT

## 2022-11-09 PROCEDURE — 99024 PR POST-OP FOLLOW-UP VISIT: ICD-10-PCS | Mod: POP,,, | Performed by: REHABILITATION UNIT

## 2022-11-09 PROCEDURE — 99024 POSTOP FOLLOW-UP VISIT: CPT | Mod: POP,,, | Performed by: REHABILITATION UNIT

## 2022-11-09 PROCEDURE — 73060 X-RAY EXAM OF HUMERUS: CPT | Mod: LT,,, | Performed by: REHABILITATION UNIT

## 2022-11-09 NOTE — PROGRESS NOTES
Subjective:      Patient ID: Cleo Cornelius is a 87 y.o. female.    Chief Complaint: Follow-up (F/u for nondisplaced fx of proximal end of left humerus, pt states has pain when moving it, presents today in sling, still has some bruising, )    HPI:   Cleo Cornelius is a 87 y.o. female who presents today for follow up evaluation of her left upper extremity.  She has remained in sling. Pain persists, but is improving. No sensory or motor changes from baseline. Not doing therapy.     Initial HPI:  She lives at an assisted living facility and sustained a controlled fall on 09/26/2022.  She is normally wheelchair bound.  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  She was taken to the emergency department after her fall where radiographs showed a left humerus fracture.  She has been in a sling.  She has pain and swelling about her left upper extremity.  She is taking some pain medication which is helpful.  No changes from her baseline sensory or motor function distally.    Past Medical History:   Diagnosis Date    HTN (hypertension)      History reviewed. No pertinent surgical history.  Social History     Socioeconomic History    Marital status:    Tobacco Use    Smoking status: Every Day     Packs/day: 0.50     Types: Cigarettes    Smokeless tobacco: Never   Substance and Sexual Activity    Alcohol use: Not Currently    Drug use: Never    Sexual activity: Not Currently         Current Outpatient Medications:     amLODIPine (NORVASC) 5 MG tablet, Take 5 mg by mouth once daily., Disp: , Rfl:     atorvastatin (LIPITOR) 40 MG tablet, Take 40 mg by mouth once daily., Disp: , Rfl:     clonazePAM (KLONOPIN) 0.5 MG tablet, Take 0.5 mg by mouth 2 (two) times daily as needed for Anxiety., Disp: , Rfl:     clopidogreL (PLAVIX) 75 mg tablet, Take 75 mg by mouth once daily., Disp: , Rfl:     DULoxetine (CYMBALTA) 60 MG capsule, Take 60 mg by mouth once daily., Disp: , Rfl:     HYDROcodone-acetaminophen  "(NORCO) 5-325 mg per tablet, Take 1 tablet by mouth every 6 (six) hours as needed for Pain., Disp: , Rfl:     insulin NPH-insulin regular, 70/30, 100 unit/mL (70-30) injection, Inject into the skin 2 (two) times daily., Disp: , Rfl:     meloxicam (MOBIC) 7.5 MG tablet, Take 7.5 mg by mouth once daily., Disp: , Rfl:     metFORMIN (GLUCOPHAGE) 500 MG tablet, Take 500 mg by mouth 2 (two) times daily with meals., Disp: , Rfl:     SITagliptin phosphate (JANUVIA) 25 MG Tab, Take by mouth once daily., Disp: , Rfl:     sodium bicarbonate 325 MG tablet, Take 325 mg by mouth 4 (four) times daily., Disp: , Rfl:     traZODone (DESYREL) 100 MG tablet, Take 100 mg by mouth every evening., Disp: , Rfl:   Review of patient's allergies indicates:   Allergen Reactions    Naproxen (bulk)     Penicillins     Sulfa (sulfonamide antibiotics)        Ht 5' 2" (1.575 m)   Wt 55.8 kg (123 lb)   LMP  (LMP Unknown)   BMI 22.50 kg/m²     Comprehensive review of systems completed and negative except as per HPI.        Objective:   Head: Normocephalic, without obvious abnormality, atraumatic  Eyes: conjunctivae/corneas clear. EOM's intact  Ears: normal external appearance  Nose: Nares normal. Septum midline. Mucosa normal. No drainage  Throat: normal findings: lips normal without lesions  Lungs: unlabored breathing on room air  Chest wall: symmetric chest rise  Heart: regular rate and rhythm  Pulses: 2+ and symmetric  Skin: Skin color, texture, turgor normal. No rashes or lesions  Neurologic: Grossly normal    left SHOULDER    Appearance:   Minimal swelling and no ecchymosis about the upper extremity    Tenderness:   None significant     ROM:   Deferred to shoulder    Strength:  Motor intact distally    Pulses: Palpable radial pulse    Neurological deficits: None    The patient has a warm and well-perfused upper extremity with capillary refill less than 2 seconds. Sensation is intact to light touch in terminal nerve distributions. The patient " has no palpable epitrochlear lymphadenopathy.      Assessment:     Imaging:   Two-view radiographs of the left humerus obtained today personally reviewed showing stable appearance of fracture of the proximal humeral shaft with transverse component at the surgical neck and spiral oblique pattern extending distally to the shaft.  Visualized joint spaces are intact.  Overall alignment is maintained. Interval healing.         1. Closed fracture of shaft of left humerus with routine healing, unspecified fracture morphology, subsequent encounter    2. Pain of left humerus          Plan:       Orders Placed This Encounter    X-Ray Humerus 2 View Left      Imaging and exam findings discussed with the patient.  She sustained an acute left humeral shaft fracture after a controlled fall at her living facility.  Overall the alignment is maintained with interval healing.  Continue nonoperative management and wean from sling.  I will see her back in 6 weeks for repeat radiographs and clinical exam for her fracture care.  All of her questions were answered and she was in agreement.

## 2022-12-30 ENCOUNTER — LAB REQUISITION (OUTPATIENT)
Dept: LAB | Facility: HOSPITAL | Age: 87
End: 2022-12-30
Payer: MEDICARE

## 2022-12-30 DIAGNOSIS — R11.2 NAUSEA WITH VOMITING, UNSPECIFIED: ICD-10-CM

## 2022-12-30 LAB
ALBUMIN SERPL-MCNC: 3.5 G/DL (ref 3.4–4.8)
ALBUMIN/GLOB SERPL: 1.1 RATIO (ref 1.1–2)
ALP SERPL-CCNC: 54 UNIT/L (ref 40–150)
ALT SERPL-CCNC: 19 UNIT/L (ref 0–55)
APPEARANCE UR: ABNORMAL
AST SERPL-CCNC: 20 UNIT/L (ref 5–34)
BACTERIA #/AREA URNS AUTO: ABNORMAL /HPF
BASOPHILS # BLD AUTO: 0.04 X10(3)/MCL (ref 0–0.2)
BASOPHILS NFR BLD AUTO: 0.5 %
BILIRUB UR QL STRIP.AUTO: NEGATIVE MG/DL
BILIRUBIN DIRECT+TOT PNL SERPL-MCNC: 0.4 MG/DL
BUN SERPL-MCNC: 18 MG/DL (ref 9.8–20.1)
CALCIUM SERPL-MCNC: 9.2 MG/DL (ref 8.4–10.2)
CHLORIDE SERPL-SCNC: 103 MMOL/L (ref 98–107)
CO2 SERPL-SCNC: 21 MMOL/L (ref 23–31)
COLOR UR AUTO: ABNORMAL
CREAT SERPL-MCNC: 0.91 MG/DL (ref 0.55–1.02)
EOSINOPHIL # BLD AUTO: 0.08 X10(3)/MCL (ref 0–0.9)
EOSINOPHIL NFR BLD AUTO: 1 %
ERYTHROCYTE [DISTWIDTH] IN BLOOD BY AUTOMATED COUNT: 14.6 % (ref 11–14.5)
GFR SERPLBLD CREATININE-BSD FMLA CKD-EPI: >60 MLS/MIN/1.73/M2
GLOBULIN SER-MCNC: 3.3 GM/DL (ref 2.4–3.5)
GLUCOSE SERPL-MCNC: 195 MG/DL (ref 82–115)
GLUCOSE UR QL STRIP.AUTO: NEGATIVE MG/DL
HCT VFR BLD AUTO: 42.8 % (ref 37–47)
HGB BLD-MCNC: 13.6 GM/DL (ref 12–16)
IMM GRANULOCYTES # BLD AUTO: 0.05 X10(3)/MCL (ref 0–0.04)
IMM GRANULOCYTES NFR BLD AUTO: 0.6 %
KETONES UR QL STRIP.AUTO: ABNORMAL MG/DL
LEUKOCYTE ESTERASE UR QL STRIP.AUTO: ABNORMAL UNIT/L
LYMPHOCYTES # BLD AUTO: 1.74 X10(3)/MCL (ref 0.6–4.6)
LYMPHOCYTES NFR BLD AUTO: 21.3 %
MCH RBC QN AUTO: 29.2 PG
MCHC RBC AUTO-ENTMCNC: 31.8 MG/DL (ref 33–36)
MCV RBC AUTO: 92 FL (ref 80–94)
MONOCYTES # BLD AUTO: 0.42 X10(3)/MCL (ref 0.1–1.3)
MONOCYTES NFR BLD AUTO: 5.1 %
NEUTROPHILS # BLD AUTO: 5.83 X10(3)/MCL (ref 2.1–9.2)
NEUTROPHILS NFR BLD AUTO: 71.5 %
NITRITE UR QL STRIP.AUTO: POSITIVE
NRBC BLD AUTO-RTO: 0 % (ref 0–1)
PH UR STRIP.AUTO: 5.5 [PH]
PLATELET # BLD AUTO: 298 X10(3)/MCL (ref 140–371)
PMV BLD AUTO: 9.7 FL (ref 9.4–12.4)
POTASSIUM SERPL-SCNC: 3.3 MMOL/L (ref 3.5–5.1)
PROT SERPL-MCNC: 6.8 GM/DL (ref 5.8–7.6)
PROT UR QL STRIP.AUTO: 100 MG/DL
RBC # BLD AUTO: 4.65 X10(6)/MCL (ref 4.2–5.4)
RBC #/AREA URNS AUTO: ABNORMAL /HPF
RBC UR QL AUTO: NEGATIVE UNIT/L
SODIUM SERPL-SCNC: 141 MMOL/L (ref 136–145)
SP GR UR STRIP.AUTO: >=1.03
SQUAMOUS #/AREA URNS AUTO: ABNORMAL /HPF
UROBILINOGEN UR STRIP-ACNC: 0.2 MG/DL
WBC # SPEC AUTO: 8.2 X10(3)/MCL (ref 4.5–11.5)
WBC #/AREA URNS AUTO: ABNORMAL /HPF

## 2022-12-30 PROCEDURE — 87077 CULTURE AEROBIC IDENTIFY: CPT | Performed by: NURSE PRACTITIONER

## 2022-12-30 PROCEDURE — 87088 URINE BACTERIA CULTURE: CPT | Performed by: NURSE PRACTITIONER

## 2022-12-30 PROCEDURE — 80053 COMPREHEN METABOLIC PANEL: CPT | Performed by: NURSE PRACTITIONER

## 2022-12-30 PROCEDURE — 85025 COMPLETE CBC W/AUTO DIFF WBC: CPT | Performed by: NURSE PRACTITIONER

## 2022-12-30 PROCEDURE — 81003 URINALYSIS AUTO W/O SCOPE: CPT | Performed by: NURSE PRACTITIONER

## 2023-01-01 LAB — BACTERIA UR CULT: ABNORMAL

## 2023-01-03 ENCOUNTER — LAB REQUISITION (OUTPATIENT)
Dept: LAB | Facility: HOSPITAL | Age: 88
End: 2023-01-03
Payer: MEDICARE

## 2023-01-03 DIAGNOSIS — E55.9 VITAMIN D DEFICIENCY, UNSPECIFIED: ICD-10-CM

## 2023-01-03 LAB — DEPRECATED CALCIDIOL+CALCIFEROL SERPL-MC: 28.9 NG/ML (ref 30–80)

## 2023-01-03 PROCEDURE — 82306 VITAMIN D 25 HYDROXY: CPT | Performed by: FAMILY MEDICINE

## 2023-01-11 ENCOUNTER — OFFICE VISIT (OUTPATIENT)
Dept: ORTHOPEDICS | Facility: CLINIC | Age: 88
End: 2023-01-11
Payer: MEDICARE

## 2023-01-11 ENCOUNTER — HOSPITAL ENCOUNTER (OUTPATIENT)
Dept: RADIOLOGY | Facility: CLINIC | Age: 88
Discharge: HOME | End: 2023-01-11
Attending: REHABILITATION UNIT
Payer: MEDICARE

## 2023-01-11 VITALS — WEIGHT: 123 LBS | HEIGHT: 62 IN | BODY MASS INDEX: 22.63 KG/M2

## 2023-01-11 DIAGNOSIS — S42.302D CLOSED FRACTURE OF SHAFT OF LEFT HUMERUS WITH ROUTINE HEALING, UNSPECIFIED FRACTURE MORPHOLOGY, SUBSEQUENT ENCOUNTER: Primary | ICD-10-CM

## 2023-01-11 DIAGNOSIS — M89.8X2 PAIN OF LEFT HUMERUS: ICD-10-CM

## 2023-01-11 PROCEDURE — 99024 PR POST-OP FOLLOW-UP VISIT: ICD-10-PCS | Mod: POP,,, | Performed by: REHABILITATION UNIT

## 2023-01-11 PROCEDURE — 73060 X-RAY EXAM OF HUMERUS: CPT | Mod: LT,,, | Performed by: REHABILITATION UNIT

## 2023-01-11 PROCEDURE — 99024 POSTOP FOLLOW-UP VISIT: CPT | Mod: POP,,, | Performed by: REHABILITATION UNIT

## 2023-01-11 PROCEDURE — 73060 XR HUMERUS 2 VIEW LEFT: ICD-10-PCS | Mod: LT,,, | Performed by: REHABILITATION UNIT

## 2023-01-11 NOTE — PROGRESS NOTES
Subjective:      Patient ID: Cleo Cornelius is a 87 y.o. female.    Chief Complaint: Follow-up (F/u for Nondisplaced fx of proximal end of left humerus, pt has no complaints of pain, )    HPI:   Cleo Cornelius is a 87 y.o. female who presents today for follow up evaluation of her left upper extremity. She is doing well. No pain. She progressed her activities to baseline without issues. No sensory or motor changes from baseline.      Initial HPI:  She lives at an assisted living facility and sustained a controlled fall on 09/26/2022.  She is normally wheelchair bound.  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  She was taken to the emergency department after her fall where radiographs showed a left humerus fracture.  She has been in a sling.  She has pain and swelling about her left upper extremity.  She is taking some pain medication which is helpful.  No changes from her baseline sensory or motor function distally.    Past Medical History:   Diagnosis Date    HTN (hypertension)      History reviewed. No pertinent surgical history.  Social History     Socioeconomic History    Marital status:    Tobacco Use    Smoking status: Every Day     Packs/day: 0.50     Types: Cigarettes    Smokeless tobacco: Never   Substance and Sexual Activity    Alcohol use: Not Currently    Drug use: Never    Sexual activity: Not Currently         Current Outpatient Medications:     amLODIPine (NORVASC) 5 MG tablet, Take 5 mg by mouth once daily., Disp: , Rfl:     atorvastatin (LIPITOR) 40 MG tablet, Take 40 mg by mouth once daily., Disp: , Rfl:     clonazePAM (KLONOPIN) 0.5 MG tablet, Take 0.5 mg by mouth 2 (two) times daily as needed for Anxiety., Disp: , Rfl:     clopidogreL (PLAVIX) 75 mg tablet, Take 75 mg by mouth once daily., Disp: , Rfl:     DULoxetine (CYMBALTA) 60 MG capsule, Take 60 mg by mouth once daily., Disp: , Rfl:     HYDROcodone-acetaminophen (NORCO) 5-325 mg per tablet, Take 1 tablet by  "mouth every 6 (six) hours as needed for Pain., Disp: , Rfl:     insulin NPH-insulin regular, 70/30, 100 unit/mL (70-30) injection, Inject into the skin 2 (two) times daily., Disp: , Rfl:     meloxicam (MOBIC) 7.5 MG tablet, Take 7.5 mg by mouth once daily., Disp: , Rfl:     metFORMIN (GLUCOPHAGE) 500 MG tablet, Take 500 mg by mouth 2 (two) times daily with meals., Disp: , Rfl:     SITagliptin phosphate (JANUVIA) 25 MG Tab, Take by mouth once daily., Disp: , Rfl:     sodium bicarbonate 325 MG tablet, Take 325 mg by mouth 4 (four) times daily., Disp: , Rfl:     traZODone (DESYREL) 100 MG tablet, Take 100 mg by mouth every evening., Disp: , Rfl:   Review of patient's allergies indicates:   Allergen Reactions    Naproxen (bulk)     Penicillins     Sulfa (sulfonamide antibiotics)        Ht 5' 2" (1.575 m)   Wt 55.8 kg (123 lb)   BMI 22.50 kg/m²     Comprehensive review of systems completed and negative except as per HPI.        Objective:   Head: Normocephalic, without obvious abnormality, atraumatic  Eyes: conjunctivae/corneas clear. EOM's intact  Ears: normal external appearance  Nose: Nares normal. Septum midline. Mucosa normal. No drainage  Throat: normal findings: lips normal without lesions  Lungs: unlabored breathing on room air  Chest wall: symmetric chest rise  Heart: regular rate and rhythm  Pulses: 2+ and symmetric  Skin: Skin color, texture, turgor normal. No rashes or lesions  Neurologic: Grossly normal    left SHOULDER    Appearance:   nl    Tenderness:   None      ROM:   baseline    Strength:  Motor intact      Pulses: Palpable radial pulse    Neurological deficits: None    The patient has a warm and well-perfused upper extremity with capillary refill less than 2 seconds. Sensation is intact to light touch in terminal nerve distributions. The patient has no palpable epitrochlear lymphadenopathy.      Assessment:     Imaging:   Two-view radiographs of the left humerus obtained today personally reviewed " showing stable appearance of fracture of the proximal humeral shaft with transverse component at the surgical neck and spiral oblique pattern extending distally to the shaft. Interval healing. Visualized joint spaces are intact.  Overall alignment is maintained.          1. Closed fracture of shaft of left humerus with routine healing, unspecified fracture morphology, subsequent encounter    2. Pain of left humerus          Plan:       Orders Placed This Encounter    X-Ray Humerus 2 View Left      Imaging and exam findings discussed with the patient. 3 months out from injury with clinical and radiographic healing. She has returned to baseline function. F/u PRN. All of her questions were answered and she was in agreement.

## 2023-03-07 ENCOUNTER — LAB REQUISITION (OUTPATIENT)
Dept: LAB | Facility: HOSPITAL | Age: 88
End: 2023-03-07
Payer: MEDICARE

## 2023-03-07 DIAGNOSIS — I10 ESSENTIAL (PRIMARY) HYPERTENSION: ICD-10-CM

## 2023-03-07 DIAGNOSIS — E56.9 VITAMIN DEFICIENCY, UNSPECIFIED: ICD-10-CM

## 2023-03-07 LAB — DEPRECATED CALCIDIOL+CALCIFEROL SERPL-MC: 28 NG/ML (ref 30–80)

## 2023-03-07 PROCEDURE — 82306 VITAMIN D 25 HYDROXY: CPT | Performed by: FAMILY MEDICINE

## 2023-04-04 ENCOUNTER — LAB REQUISITION (OUTPATIENT)
Dept: LAB | Facility: HOSPITAL | Age: 88
End: 2023-04-04
Payer: MEDICARE

## 2023-04-04 DIAGNOSIS — I25.10 ATHEROSCLEROTIC HEART DISEASE OF NATIVE CORONARY ARTERY WITHOUT ANGINA PECTORIS: ICD-10-CM

## 2023-04-04 DIAGNOSIS — N18.30 CHRONIC KIDNEY DISEASE, STAGE 3 UNSPECIFIED: ICD-10-CM

## 2023-04-04 DIAGNOSIS — I10 ESSENTIAL (PRIMARY) HYPERTENSION: ICD-10-CM

## 2023-04-04 DIAGNOSIS — E87.1 HYPO-OSMOLALITY AND HYPONATREMIA: ICD-10-CM

## 2023-04-04 DIAGNOSIS — E11.8 TYPE 2 DIABETES MELLITUS WITH UNSPECIFIED COMPLICATIONS: ICD-10-CM

## 2023-04-04 DIAGNOSIS — I12.9 HYPERTENSIVE CHRONIC KIDNEY DISEASE WITH STAGE 1 THROUGH STAGE 4 CHRONIC KIDNEY DISEASE, OR UNSPECIFIED CHRONIC KIDNEY DISEASE: ICD-10-CM

## 2023-04-04 DIAGNOSIS — E55.9 VITAMIN D DEFICIENCY, UNSPECIFIED: ICD-10-CM

## 2023-04-04 DIAGNOSIS — E87.6 HYPOKALEMIA: ICD-10-CM

## 2023-04-04 LAB
ALBUMIN SERPL-MCNC: 3.1 G/DL (ref 3.4–4.8)
ALBUMIN/GLOB SERPL: 1.2 RATIO (ref 1.1–2)
ALP SERPL-CCNC: 53 UNIT/L (ref 40–150)
ALT SERPL-CCNC: 20 UNIT/L (ref 0–55)
AST SERPL-CCNC: 17 UNIT/L (ref 5–34)
BILIRUBIN DIRECT+TOT PNL SERPL-MCNC: 0.4 MG/DL
BUN SERPL-MCNC: 32.2 MG/DL (ref 9.8–20.1)
CALCIUM SERPL-MCNC: 9.2 MG/DL (ref 8.4–10.2)
CHLORIDE SERPL-SCNC: 105 MMOL/L (ref 98–107)
CHOLEST SERPL-MCNC: 103 MG/DL
CHOLEST/HDLC SERPL: 3 {RATIO} (ref 0–5)
CO2 SERPL-SCNC: 22 MMOL/L (ref 23–31)
CREAT SERPL-MCNC: 0.92 MG/DL (ref 0.55–1.02)
DEPRECATED CALCIDIOL+CALCIFEROL SERPL-MC: 33.9 NG/ML (ref 30–80)
ERYTHROCYTE [DISTWIDTH] IN BLOOD BY AUTOMATED COUNT: 13.2 % (ref 11.5–17)
EST. AVERAGE GLUCOSE BLD GHB EST-MCNC: 128.4 MG/DL
GFR SERPLBLD CREATININE-BSD FMLA CKD-EPI: >60 MLS/MIN/1.73/M2
GLOBULIN SER-MCNC: 2.6 GM/DL (ref 2.4–3.5)
GLUCOSE SERPL-MCNC: 89 MG/DL (ref 82–115)
HBA1C MFR BLD: 6.1 %
HCT VFR BLD AUTO: 37.7 % (ref 37–47)
HDLC SERPL-MCNC: 31 MG/DL (ref 35–60)
HGB BLD-MCNC: 12.2 G/DL (ref 12–16)
LDLC SERPL CALC-MCNC: 39 MG/DL (ref 50–140)
MCH RBC QN AUTO: 31.1 PG (ref 27–31)
MCHC RBC AUTO-ENTMCNC: 32.4 G/DL (ref 33–36)
MCV RBC AUTO: 96.2 FL (ref 80–94)
NRBC BLD AUTO-RTO: 0 %
PLATELET # BLD AUTO: 283 X10(3)/MCL (ref 130–400)
PMV BLD AUTO: 9.6 FL (ref 7.4–10.4)
POTASSIUM SERPL-SCNC: 4.2 MMOL/L (ref 3.5–5.1)
PROT SERPL-MCNC: 5.7 GM/DL (ref 5.8–7.6)
RBC # BLD AUTO: 3.92 X10(6)/MCL (ref 4.2–5.4)
SODIUM SERPL-SCNC: 138 MMOL/L (ref 136–145)
TRIGL SERPL-MCNC: 163 MG/DL (ref 37–140)
TSH SERPL-ACNC: 1.51 UIU/ML (ref 0.35–4.94)
VLDLC SERPL CALC-MCNC: 33 MG/DL
WBC # SPEC AUTO: 8 X10(3)/MCL (ref 4.5–11.5)

## 2023-04-04 PROCEDURE — 85027 COMPLETE CBC AUTOMATED: CPT | Performed by: FAMILY MEDICINE

## 2023-04-04 PROCEDURE — 84443 ASSAY THYROID STIM HORMONE: CPT | Performed by: FAMILY MEDICINE

## 2023-04-04 PROCEDURE — 82306 VITAMIN D 25 HYDROXY: CPT | Performed by: FAMILY MEDICINE

## 2023-04-04 PROCEDURE — 80053 COMPREHEN METABOLIC PANEL: CPT | Performed by: FAMILY MEDICINE

## 2023-04-04 PROCEDURE — 83036 HEMOGLOBIN GLYCOSYLATED A1C: CPT | Performed by: FAMILY MEDICINE

## 2023-04-04 PROCEDURE — 80061 LIPID PANEL: CPT | Performed by: FAMILY MEDICINE

## 2023-06-02 ENCOUNTER — LAB REQUISITION (OUTPATIENT)
Dept: LAB | Facility: HOSPITAL | Age: 88
End: 2023-06-02
Payer: MEDICARE

## 2023-06-02 DIAGNOSIS — I12.9 HYPERTENSIVE CHRONIC KIDNEY DISEASE WITH STAGE 1 THROUGH STAGE 4 CHRONIC KIDNEY DISEASE, OR UNSPECIFIED CHRONIC KIDNEY DISEASE: ICD-10-CM

## 2023-06-02 LAB
CREAT UR-MCNC: 72 MG/DL (ref 47–110)
MICROALBUMIN UR-MCNC: 363.2 UG/ML
MICROALBUMIN/CREAT RATIO PNL UR: 504.4 MG/GM CR (ref 0–30)

## 2023-06-02 PROCEDURE — 82043 UR ALBUMIN QUANTITATIVE: CPT | Performed by: FAMILY MEDICINE

## 2023-06-04 ENCOUNTER — HOSPITAL ENCOUNTER (EMERGENCY)
Facility: HOSPITAL | Age: 88
Discharge: HOME | End: 2023-06-04
Attending: EMERGENCY MEDICINE
Payer: MEDICARE

## 2023-06-04 VITALS
DIASTOLIC BLOOD PRESSURE: 65 MMHG | WEIGHT: 154.31 LBS | HEART RATE: 69 BPM | TEMPERATURE: 98 F | SYSTOLIC BLOOD PRESSURE: 163 MMHG | BODY MASS INDEX: 26.34 KG/M2 | OXYGEN SATURATION: 99 % | HEIGHT: 64 IN | RESPIRATION RATE: 20 BRPM

## 2023-06-04 DIAGNOSIS — N39.0 URINARY TRACT INFECTION WITHOUT HEMATURIA, SITE UNSPECIFIED: Primary | ICD-10-CM

## 2023-06-04 DIAGNOSIS — S00.03XA CONTUSION OF SCALP, INITIAL ENCOUNTER: ICD-10-CM

## 2023-06-04 LAB
ALBUMIN SERPL-MCNC: 3.7 G/DL (ref 3.4–4.8)
ALBUMIN/GLOB SERPL: 1.1 RATIO (ref 1.1–2)
ALP SERPL-CCNC: 75 UNIT/L (ref 40–150)
ALT SERPL-CCNC: 23 UNIT/L (ref 0–55)
APPEARANCE UR: ABNORMAL
APTT PPP: 34.4 SECONDS (ref 23.2–33.7)
AST SERPL-CCNC: 19 UNIT/L (ref 5–34)
BACTERIA #/AREA URNS AUTO: ABNORMAL /HPF
BASOPHILS # BLD AUTO: 0.04 X10(3)/MCL
BASOPHILS NFR BLD AUTO: 0.3 %
BILIRUB UR QL STRIP.AUTO: NEGATIVE MG/DL
BILIRUBIN DIRECT+TOT PNL SERPL-MCNC: 0.7 MG/DL
BUN SERPL-MCNC: 24.2 MG/DL (ref 9.8–20.1)
CALCIUM SERPL-MCNC: 9.4 MG/DL (ref 8.4–10.2)
CHLORIDE SERPL-SCNC: 104 MMOL/L (ref 98–107)
CO2 SERPL-SCNC: 22 MMOL/L (ref 23–31)
COLOR UR: YELLOW
CREAT SERPL-MCNC: 0.94 MG/DL (ref 0.55–1.02)
EOSINOPHIL # BLD AUTO: 0.07 X10(3)/MCL (ref 0–0.9)
EOSINOPHIL NFR BLD AUTO: 0.6 %
ERYTHROCYTE [DISTWIDTH] IN BLOOD BY AUTOMATED COUNT: 12.5 % (ref 11.5–17)
GFR SERPLBLD CREATININE-BSD FMLA CKD-EPI: 59 MLS/MIN/1.73/M2
GLOBULIN SER-MCNC: 3.4 GM/DL (ref 2.4–3.5)
GLUCOSE SERPL-MCNC: 203 MG/DL (ref 82–115)
GLUCOSE UR QL STRIP.AUTO: NEGATIVE MG/DL
HCT VFR BLD AUTO: 43.9 % (ref 37–47)
HGB BLD-MCNC: 14 G/DL (ref 12–16)
IMM GRANULOCYTES # BLD AUTO: 0.06 X10(3)/MCL (ref 0–0.04)
IMM GRANULOCYTES NFR BLD AUTO: 0.5 %
INR BLD: 1.04 (ref 0–1.3)
KETONES UR QL STRIP.AUTO: ABNORMAL MG/DL
LEUKOCYTE ESTERASE UR QL STRIP.AUTO: ABNORMAL UNIT/L
LYMPHOCYTES # BLD AUTO: 1.74 X10(3)/MCL (ref 0.6–4.6)
LYMPHOCYTES NFR BLD AUTO: 14.7 %
MCH RBC QN AUTO: 30.6 PG (ref 27–31)
MCHC RBC AUTO-ENTMCNC: 31.9 G/DL (ref 33–36)
MCV RBC AUTO: 95.9 FL (ref 80–94)
MONOCYTES # BLD AUTO: 0.53 X10(3)/MCL (ref 0.1–1.3)
MONOCYTES NFR BLD AUTO: 4.5 %
NEUTROPHILS # BLD AUTO: 9.37 X10(3)/MCL (ref 2.1–9.2)
NEUTROPHILS NFR BLD AUTO: 79.4 %
NITRITE UR QL STRIP.AUTO: POSITIVE
NRBC BLD AUTO-RTO: 0 %
PH UR STRIP.AUTO: 7 [PH]
PLATELET # BLD AUTO: 261 X10(3)/MCL (ref 130–400)
PMV BLD AUTO: 9.1 FL (ref 7.4–10.4)
POTASSIUM SERPL-SCNC: 4.2 MMOL/L (ref 3.5–5.1)
PROT SERPL-MCNC: 7.1 GM/DL (ref 5.8–7.6)
PROT UR QL STRIP.AUTO: ABNORMAL MG/DL
PROTHROMBIN TIME: 13.5 SECONDS (ref 12.5–14.5)
RBC # BLD AUTO: 4.58 X10(6)/MCL (ref 4.2–5.4)
RBC #/AREA URNS AUTO: <5 /HPF
RBC UR QL AUTO: NEGATIVE UNIT/L
SODIUM SERPL-SCNC: 140 MMOL/L (ref 136–145)
SP GR UR STRIP.AUTO: 1.01 (ref 1–1.03)
SQUAMOUS #/AREA URNS AUTO: <5 /HPF
TROPONIN I SERPL-MCNC: <0.01 NG/ML (ref 0–0.04)
UROBILINOGEN UR STRIP-ACNC: 1 MG/DL
WBC # SPEC AUTO: 11.81 X10(3)/MCL (ref 4.5–11.5)
WBC #/AREA URNS AUTO: 23 /HPF

## 2023-06-04 PROCEDURE — 85025 COMPLETE CBC W/AUTO DIFF WBC: CPT | Performed by: EMERGENCY MEDICINE

## 2023-06-04 PROCEDURE — 87088 URINE BACTERIA CULTURE: CPT | Performed by: EMERGENCY MEDICINE

## 2023-06-04 PROCEDURE — 85610 PROTHROMBIN TIME: CPT | Performed by: EMERGENCY MEDICINE

## 2023-06-04 PROCEDURE — 81001 URINALYSIS AUTO W/SCOPE: CPT | Performed by: EMERGENCY MEDICINE

## 2023-06-04 PROCEDURE — 63600175 PHARM REV CODE 636 W HCPCS: Performed by: EMERGENCY MEDICINE

## 2023-06-04 PROCEDURE — 84484 ASSAY OF TROPONIN QUANT: CPT | Performed by: EMERGENCY MEDICINE

## 2023-06-04 PROCEDURE — 25000003 PHARM REV CODE 250: Performed by: EMERGENCY MEDICINE

## 2023-06-04 PROCEDURE — 87186 SC STD MICRODIL/AGAR DIL: CPT | Performed by: EMERGENCY MEDICINE

## 2023-06-04 PROCEDURE — 96365 THER/PROPH/DIAG IV INF INIT: CPT

## 2023-06-04 PROCEDURE — 85730 THROMBOPLASTIN TIME PARTIAL: CPT | Performed by: EMERGENCY MEDICINE

## 2023-06-04 PROCEDURE — 96367 TX/PROPH/DG ADDL SEQ IV INF: CPT

## 2023-06-04 PROCEDURE — 99285 EMERGENCY DEPT VISIT HI MDM: CPT | Mod: 25

## 2023-06-04 PROCEDURE — 80053 COMPREHEN METABOLIC PANEL: CPT | Performed by: EMERGENCY MEDICINE

## 2023-06-04 RX ORDER — CIPROFLOXACIN 2 MG/ML
400 INJECTION, SOLUTION INTRAVENOUS
Status: COMPLETED | OUTPATIENT
Start: 2023-06-04 | End: 2023-06-04

## 2023-06-04 RX ORDER — CEFDINIR 300 MG/1
300 CAPSULE ORAL 2 TIMES DAILY
Qty: 20 CAPSULE | Refills: 0 | Status: SHIPPED | OUTPATIENT
Start: 2023-06-04 | End: 2023-06-04 | Stop reason: SDUPTHER

## 2023-06-04 RX ORDER — CEFDINIR 300 MG/1
300 CAPSULE ORAL 2 TIMES DAILY
Qty: 20 CAPSULE | Refills: 0 | Status: SHIPPED | OUTPATIENT
Start: 2023-06-04 | End: 2023-06-14

## 2023-06-04 RX ADMIN — CIPROFLOXACIN 400 MG: 2 INJECTION, SOLUTION INTRAVENOUS at 06:06

## 2023-06-04 RX ADMIN — CEFTRIAXONE SODIUM 1 G: 1 INJECTION, POWDER, FOR SOLUTION INTRAMUSCULAR; INTRAVENOUS at 06:06

## 2023-06-04 NOTE — ED PROVIDER NOTES
Encounter Date: 6/4/2023    SCRIBE #1 NOTE: I, Thanh Drew, am scribing for, and in the presence of,  Mane Vernon MD. I have scribed the following portions of the note - Other sections scribed: HPI,ROS,PE.     History     Chief Complaint   Patient presents with    Fall     Pt arrives via [facility], EMS reports that the pt coming from [facility], NH staff report that pt was found on the floor on side of bed. No head trauma noted, NH called to confirm no blood thinners. Toi , pt's nurse at the NH reports no thinners. Pt Left leg is shortened and rotated, c/o left leg pain. + PMS     87 y/o female with PMHx of HTN, CVA, CKD, Anemia, COPD, DM, PAD, and dementia presents to ED via EMS following a fall tonight. EMS reports pt was last seen in bed ~0200. EMS reports pt was found on the ground. EMS reports 180/80 pressure en route. EMS states pt has chronic pain to left arm due to stroke. Pt denies any hip pain.     The history is provided by the patient and the EMS personnel. No  was used.   Fall  The accident occurred just prior to arrival. The fall occurred from a bed.   Review of patient's allergies indicates:   Allergen Reactions    Naproxen (bulk)     Penicillins     Sulfa (sulfonamide antibiotics)      Past Medical History:   Diagnosis Date    HTN (hypertension)      No past surgical history on file.  Family History   Problem Relation Age of Onset    No Known Problems Mother     No Known Problems Father     No Known Problems Sister      Social History     Tobacco Use    Smoking status: Every Day     Packs/day: 0.50     Types: Cigarettes    Smokeless tobacco: Never   Substance Use Topics    Alcohol use: Not Currently    Drug use: Never     Review of Systems   Musculoskeletal:         Denies hip pain.     Physical Exam     Initial Vitals [06/04/23 0325]   BP Pulse Resp Temp SpO2   (!) 184/65 67 18 97.9 °F (36.6 °C) 99 %      MAP       --         Physical Exam    Nursing note and  vitals reviewed.  Constitutional: She appears well-developed. No distress.   HENT:   Head: Normocephalic and atraumatic.   Mouth/Throat: Oropharynx is clear and moist.   Eyes: Conjunctivae and EOM are normal. Pupils are equal, round, and reactive to light.   Neck: Neck supple.   Cardiovascular:  Normal rate and regular rhythm.           No murmur heard.  Pulmonary/Chest: Breath sounds normal. No respiratory distress. She exhibits no tenderness.   Abdominal: Abdomen is soft. Bowel sounds are normal. She exhibits no distension. There is no abdominal tenderness.   Musculoskeletal:         General: No tenderness. Normal range of motion.      Cervical back: Neck supple.      Lumbar back: Normal. Normal range of motion.      Comments: no tenderness to palpation to left lower extremity. Left lower extremity is neurovascularly intact.      Neurological: She is alert and oriented to person, place, and time. She has normal strength. No cranial nerve deficit or sensory deficit.   Psychiatric: She has a normal mood and affect. Judgment normal.       ED Course   Procedures  Labs Reviewed   COMPREHENSIVE METABOLIC PANEL - Abnormal; Notable for the following components:       Result Value    Carbon Dioxide 22 (*)     Glucose Level 203 (*)     Blood Urea Nitrogen 24.2 (*)     All other components within normal limits   URINALYSIS, REFLEX TO URINE CULTURE - Abnormal; Notable for the following components:    Appearance, UA Cloudy (*)     Protein, UA 3+ (*)     Ketones, UA 1+ (*)     Nitrites, UA Positive (*)     Leukocyte Esterase, UA 2+ (*)     All other components within normal limits   CBC WITH DIFFERENTIAL - Abnormal; Notable for the following components:    WBC 11.81 (*)     MCV 95.9 (*)     MCHC 31.9 (*)     Neut # 9.37 (*)     IG# 0.06 (*)     All other components within normal limits   APTT - Abnormal; Notable for the following components:    PTT 34.4 (*)     All other components within normal limits   URINALYSIS, MICROSCOPIC  - Abnormal; Notable for the following components:    WBC, UA 23 (*)     Bacteria, UA 4+ (*)     All other components within normal limits   TROPONIN I - Normal   PROTIME-INR - Normal   CULTURE, URINE   CBC W/ AUTO DIFFERENTIAL    Narrative:     The following orders were created for panel order CBC auto differential.  Procedure                               Abnormality         Status                     ---------                               -----------         ------                     CBC with Differential[[id number]]        Abnormal            Final result                 Please view results for these tests on the individual orders.          Imaging Results              CT Shoulder Without Contrast Left (Preliminary result)  Result time 06/04/23 05:55:14      Preliminary result by Toin Prakash MD (06/04/23 05:55:14)                   Narrative:    START OF REPORT:  Technique: CT of the left was performed without intravenous contrast.    Comparison: Comparison is with study dated2023-06-04 04:17:15.    History: Fall, left shoulder/scapula pain.    Findings:  Thorax: Intrathoracic findings described in the dedicated report of the chest.  Shoulder:  Humerus: A cortical deformity is seen along the neck of the left humerus. No acute fracture is seen in the visualized proximal humerus.  Shoulder joint: No evidence of shoulder dislocation.  Miscellaneous: Unremarkable.      Impression:  1. No acute fracture dislocation or subluxation is seen.                                         CT Chest Without Contrast (Preliminary result)  Result time 06/04/23 05:54:10      Preliminary result by Toni Prakash MD (06/04/23 05:54:10)                   Narrative:    START OF REPORT:  Technique: CT Scan of the chest was performed without intravenous contrast with direct axial as well as sagittal and, coronal, reconstruction images.    Dosage Information: Automated Exposure Control was utilized.    Comparison: None.    Clinical  History: Fall, left chest pain.    Findings:  Soft Tissues: Unremarkable.  Neck: The visualized soft tissues of the neck appear unremarkable.  Mediastinum: The mediastinal structures are within normal limits.  Heart: Moderate cardiomegaly is seen.  Aorta: Moderate aortic calcification is seen in the thoracic aorta.  Lungs: There is moderate non specific dependent change at the lung bases. A calcified pulmonary nodule is seen in the lingula measuring 5.6 mm (image 39 series 4). A bulla is seen in the left upper lobe. The lungs otherwise appear clear with no acute focal infiltrate or consolidation.  Pleura: No effusions or pneumothorax are identified.  Bony Structures:  Spine: Moderate spondylolytic changes are seen in the thoracic spine.  Abdomen: Surgical clips are seen in the right upper quadrant suggesting prior cholecystectomy.      Impression:  1. No acute intrathoracic process identified. Details and other findings as discussed above.                                         CT Hip Without Contrast Left (Preliminary result)  Result time 06/04/23 05:53:23      Preliminary result by Toni Prakash MD (06/04/23 05:53:23)                   Narrative:    START OF REPORT:  Technique: CT scan of the left hip was performed without intravenous contrast with axial as well as sagittal and coronal images.    Comparison: Comparison is with study dated â2023-06-04 04:19:56â.    Dosage Information: Automated exposure control was utilized.    Clinical history: Fall, left hip pain.    Findings:  Hip: No acute fracture of the left hip bony structures is seen. Degenerative changes along the left hip is seen.  Other Osseous structures: The rest of the visualized osseus structures appear intact.  Vascular Structures: There are mild atherosclerotic changes of the iliac artery.  Soft tissues: Atrophy of the visualized gluteal muscles is noted.    Miscellaneous: There is moderate stool in the colon which could reflect an element  of constipation.      Impression:  1. No acute fracture of the left hip bony structures is seen.                                         X-Ray Shoulder 2 or More Views Left (In process)                      X-Ray Hip 2 or 3 views Left (with Pelvis when performed) (In process)                      CT Cervical Spine Without Contrast (Preliminary result)  Result time 06/04/23 04:39:37      Preliminary result by Toni Prakash MD (06/04/23 04:39:37)                   Narrative:    START OF REPORT:  Technique: CT of the cervical spine was performed without intravenous contrast with axial as well as sagittal and coronal images.    Comparison: None.    Dosage Information: Automated exposure control was utilized.    Clinical history: Fall, found on the floor on side of bed. No head trauma noted,.    Findings:  Lung apices: The visualized lung apices appear unremarkable.  Spine:  Spinal canal: The spinal canal appears unremarkable.  Spinal cord: The spinal cord appears unremarkable.  Mineralization: Within normal limits for age.  Scoliosis: No significant scoliosis is seen.  Vertebral Fusion: No vertebral fusion is identified.  Listhesis: There is grade I degenerative anterolisthesis of C4 on C5.  Lordosis: The cervical lordosis is maintained.  Intervertebral disc spaces: Mildly decreased disc height is seen at C6-C7. Moderately decreased disc height is seen at C5-C6.  Osteophytes: Mild anterior marginal multilevel endplate osteophytes are seen. Small posterior marginal osteophytes are seen off the opposing endplates at C5-C6.  Endplate Sclerosis: Subtle endplate sclerosis is seen off the opposing endplates at C5-C6 and C6-C7.  Uncovertebral degenerative changes: Mild uncovertebral joint degenerative changes are seen at C5-C6 and C6-C7.  Facet degenerative changes: Mild to moderate multilevel facet degenerative changes are seen.  Fractures: No acute cervical spine fracture dislocation or subluxation is  seen.      Impression:  1. No acute cervical spine fracture dislocation or subluxation is seen.  2. Degenerative changes and other details as above.                                         CT Head Without Contrast (Preliminary result)  Result time 06/04/23 04:39:12      Preliminary result by Toni Prakash MD (06/04/23 04:39:12)                   Narrative:    START OF REPORT:  Technique: CT of the head was performed without intravenous contrast with axial as well as coronal and sagittal images.    Comparison: None.    Dosage Information: Automated exposure control was utilized.    Clinical history: Fall, found on the floor on side of bed. No head trauma noted,.    Findings:  Hemorrhage: No acute intracranial hemorrhage is seen.  CSF spaces: The ventricles, sulci and basal cisterns all appear moderately prominent consistent with global cerebral atrophy.  Brain parenchyma: There is preservation of the grey white junction throughout. Mild microvascular change is seen in portions of the periventricular and deep white matter tracts. Old lacunar infarcts are again noted in the left basal ganglia and right thalamus (series 3; image 27).  Cerebellum: Unremarkable.  Sella and skull base: The sella appears to be within normal limits for age.  Herniation: None.  Intracranial calcifications: Incidental note is made of bilateral choroid plexus calcification. Incidental note is made of some pineal region calcification. Incidental note is made of some calcification of the falx. Incidental note is made of subtle bilateral basal ganglia calcifcation.  Calvarium: No acute linear or depressed skull fracture is seen.  Scalp: Mild left parietal scalp swelling is seen (series 3; image 45).    Maxillofacial Structures:  Paranasal sinuses: There is some mucoperiosteal thickening in the left maxillary sinus with a fluid level. This is new since the prior examination. Again noted is a fluid level in the right sphenoid sinus with interval  development of mucosal thickening.  Orbits: The orbits appear unremarkable.  Zygomatic arches: The zygomatic arches are intact and unremarkable.  Temporal bones and mastoids: The temporal bones and mastoids appear unremarkable.  TMJ: The mandibular condyles appear normally placed with respect to the mandibular fossa.      Impression:  1. No acute intracranial traumatic injury identified. Details and other findings as noted above.                                         X-Ray Chest 1 View (In process)                      Medications   ciprofloxacin (CIPRO)400mg/200ml D5W IVPB 400 mg (400 mg Intravenous New Bag 6/4/23 0620)   cefTRIAXone (ROCEPHIN) 1 g in dextrose 5 % in water (D5W) 5 % 50 mL IVPB (MB+) (has no administration in time range)         Medical Decision Making  Differential diagnosis includes but is not limited to intracranial hemorrhage, hip fracture, dehydration, and UTI.     Amount and/or Complexity of Data Reviewed  Independent Historian: EMS     Details: EMS reports pt was last seen in bed ~0200. Reports pt was found on the ground. Reports 180/80 pressure en route. States pt has chronic pain to left arm due to stroke.          Scribe Attestation:   Scribe #1: I performed the above scribed service and the documentation accurately describes the services I performed. I attest to the accuracy of the note.    Attending Attestation:           Physician Attestation for Scribe:  Physician Attestation Statement for Scribe #1: I, Mane Vernon MD, reviewed documentation, as scribed by Thanh Drew in my presence, and it is both accurate and complete.           ED Course as of 06/04/23 0642   Sun Jun 04, 2023   0639 The patient's CT left shoulder and chest were all negative for all these findings with the patient's even if it have a subtle fracture that picked up on MRI she not ambulatory at baseline she is mostly bed-bound at the nursing home.  She does have a urinary tract infection antibiotics here  she does have penicillin allergy his actually mild.  Patient is not altered she does not have a fever she appearing.  Some oral antibiotics in the nursing home follow up on the cultures she had a fever she can be sent back IV antibiotics. [NL]      ED Course User Index  [NL] Mane Vernon MD                 Clinical Impression:   Final diagnoses:  [N39.0] Urinary tract infection without hematuria, site unspecified (Primary)  [S00.03XA] Contusion of scalp, initial encounter        ED Disposition Condition    Discharge Stable          ED Prescriptions       Medication Sig Dispense Start Date End Date Auth. Provider    cefdinir (OMNICEF) 300 MG capsule Take 1 capsule (300 mg total) by mouth 2 (two) times daily. for 10 days 20 capsule 6/4/2023 6/14/2023 Mane Vernon MD          Follow-up Information       Follow up With Specialties Details Why Contact Info    PCP  Call in 1 day  follow up with PCP ni 1-2 days             Mane Vernon MD  06/04/23 0676

## 2023-06-07 LAB
BACTERIA UR CULT: ABNORMAL
BACTERIA UR CULT: ABNORMAL

## 2023-07-05 ENCOUNTER — LAB REQUISITION (OUTPATIENT)
Dept: LAB | Facility: HOSPITAL | Age: 88
End: 2023-07-05
Payer: MEDICARE

## 2023-07-05 DIAGNOSIS — I10 ESSENTIAL (PRIMARY) HYPERTENSION: ICD-10-CM

## 2023-07-05 LAB
ANION GAP SERPL CALC-SCNC: 12 MEQ/L
BUN SERPL-MCNC: 26.6 MG/DL (ref 9.8–20.1)
CALCIUM SERPL-MCNC: 8.9 MG/DL (ref 8.4–10.2)
CHLORIDE SERPL-SCNC: 104 MMOL/L (ref 98–107)
CO2 SERPL-SCNC: 24 MMOL/L (ref 23–31)
CREAT SERPL-MCNC: 1.02 MG/DL (ref 0.55–1.02)
CREAT/UREA NIT SERPL: 26
DEPRECATED CALCIDIOL+CALCIFEROL SERPL-MC: 37.9 NG/ML (ref 30–80)
GFR SERPLBLD CREATININE-BSD FMLA CKD-EPI: 54 MLS/MIN/1.73/M2
GLUCOSE SERPL-MCNC: 123 MG/DL (ref 82–115)
POTASSIUM SERPL-SCNC: 4.5 MMOL/L (ref 3.5–5.1)
SODIUM SERPL-SCNC: 140 MMOL/L (ref 136–145)

## 2023-07-05 PROCEDURE — 82306 VITAMIN D 25 HYDROXY: CPT | Performed by: FAMILY MEDICINE

## 2023-07-05 PROCEDURE — 80048 BASIC METABOLIC PNL TOTAL CA: CPT | Performed by: FAMILY MEDICINE

## 2023-07-12 ENCOUNTER — LAB REQUISITION (OUTPATIENT)
Dept: LAB | Facility: HOSPITAL | Age: 88
End: 2023-07-12
Payer: MEDICARE

## 2023-07-12 DIAGNOSIS — I12.9 HYPERTENSIVE CHRONIC KIDNEY DISEASE WITH STAGE 1 THROUGH STAGE 4 CHRONIC KIDNEY DISEASE, OR UNSPECIFIED CHRONIC KIDNEY DISEASE: ICD-10-CM

## 2023-07-12 LAB
ANION GAP SERPL CALC-SCNC: 13 MEQ/L
BUN SERPL-MCNC: 36.7 MG/DL (ref 9.8–20.1)
CALCIUM SERPL-MCNC: 9.3 MG/DL (ref 8.4–10.2)
CHLORIDE SERPL-SCNC: 106 MMOL/L (ref 98–107)
CO2 SERPL-SCNC: 23 MMOL/L (ref 23–31)
CREAT SERPL-MCNC: 1.11 MG/DL (ref 0.55–1.02)
CREAT/UREA NIT SERPL: 33
GFR SERPLBLD CREATININE-BSD FMLA CKD-EPI: 49 MLS/MIN/1.73/M2
GLUCOSE SERPL-MCNC: 104 MG/DL (ref 82–115)
POTASSIUM SERPL-SCNC: 4.3 MMOL/L (ref 3.5–5.1)
SODIUM SERPL-SCNC: 142 MMOL/L (ref 136–145)

## 2023-07-12 PROCEDURE — 80048 BASIC METABOLIC PNL TOTAL CA: CPT | Performed by: FAMILY MEDICINE

## 2023-09-07 ENCOUNTER — LAB REQUISITION (OUTPATIENT)
Dept: LAB | Facility: HOSPITAL | Age: 88
End: 2023-09-07
Payer: MEDICARE

## 2023-09-07 DIAGNOSIS — E55.9 VITAMIN D DEFICIENCY, UNSPECIFIED: ICD-10-CM

## 2023-09-07 LAB — DEPRECATED CALCIDIOL+CALCIFEROL SERPL-MC: 38.8 NG/ML (ref 30–80)

## 2023-09-07 PROCEDURE — 82306 VITAMIN D 25 HYDROXY: CPT | Performed by: FAMILY MEDICINE

## 2023-10-05 ENCOUNTER — LAB REQUISITION (OUTPATIENT)
Dept: LAB | Facility: HOSPITAL | Age: 88
End: 2023-10-05
Payer: MEDICARE

## 2023-10-05 DIAGNOSIS — E61.1 IRON DEFICIENCY: ICD-10-CM

## 2023-10-05 DIAGNOSIS — D55.9 ANEMIA DUE TO ENZYME DISORDER, UNSPECIFIED: ICD-10-CM

## 2023-10-05 DIAGNOSIS — E03.9 HYPOTHYROIDISM, UNSPECIFIED: ICD-10-CM

## 2023-10-05 LAB
ALBUMIN SERPL-MCNC: 3.7 G/DL (ref 3.4–4.8)
ALBUMIN/GLOB SERPL: 1.2 RATIO (ref 1.1–2)
ALP SERPL-CCNC: 56 UNIT/L (ref 40–150)
ALT SERPL-CCNC: 26 UNIT/L (ref 0–55)
AST SERPL-CCNC: 18 UNIT/L (ref 5–34)
BILIRUB SERPL-MCNC: 0.3 MG/DL
BUN SERPL-MCNC: 61 MG/DL (ref 9.8–20.1)
CALCIUM SERPL-MCNC: 9.5 MG/DL (ref 8.4–10.2)
CHLORIDE SERPL-SCNC: 104 MMOL/L (ref 98–107)
CHOLEST SERPL-MCNC: 117 MG/DL
CHOLEST/HDLC SERPL: 3 {RATIO} (ref 0–5)
CO2 SERPL-SCNC: 24 MMOL/L (ref 23–31)
CREAT SERPL-MCNC: 1.05 MG/DL (ref 0.55–1.02)
DEPRECATED CALCIDIOL+CALCIFEROL SERPL-MC: 48 NG/ML (ref 30–80)
ERYTHROCYTE [DISTWIDTH] IN BLOOD BY AUTOMATED COUNT: 12.6 % (ref 11.5–17)
EST. AVERAGE GLUCOSE BLD GHB EST-MCNC: 142.7 MG/DL
GFR SERPLBLD CREATININE-BSD FMLA CKD-EPI: 52 MLS/MIN/1.73/M2
GLOBULIN SER-MCNC: 3.1 GM/DL (ref 2.4–3.5)
GLUCOSE SERPL-MCNC: 137 MG/DL (ref 82–115)
HBA1C MFR BLD: 6.6 %
HCT VFR BLD AUTO: 40.9 % (ref 37–47)
HDLC SERPL-MCNC: 37 MG/DL (ref 35–60)
HGB BLD-MCNC: 13 G/DL (ref 12–16)
LDLC SERPL CALC-MCNC: 43 MG/DL (ref 50–140)
MCH RBC QN AUTO: 32.3 PG (ref 27–31)
MCHC RBC AUTO-ENTMCNC: 31.8 G/DL (ref 33–36)
MCV RBC AUTO: 101.5 FL (ref 80–94)
NRBC BLD AUTO-RTO: 0 %
PLATELET # BLD AUTO: 273 X10(3)/MCL (ref 130–400)
PMV BLD AUTO: 9.9 FL (ref 7.4–10.4)
POTASSIUM SERPL-SCNC: 4.4 MMOL/L (ref 3.5–5.1)
PROT SERPL-MCNC: 6.8 GM/DL (ref 5.8–7.6)
RBC # BLD AUTO: 4.03 X10(6)/MCL (ref 4.2–5.4)
SODIUM SERPL-SCNC: 145 MMOL/L (ref 136–145)
TRIGL SERPL-MCNC: 183 MG/DL (ref 37–140)
TSH SERPL-ACNC: 0.78 UIU/ML (ref 0.35–4.94)
VLDLC SERPL CALC-MCNC: 37 MG/DL
WBC # SPEC AUTO: 8.36 X10(3)/MCL (ref 4.5–11.5)

## 2023-10-05 PROCEDURE — 80061 LIPID PANEL: CPT | Performed by: FAMILY MEDICINE

## 2023-10-05 PROCEDURE — 80053 COMPREHEN METABOLIC PANEL: CPT | Performed by: FAMILY MEDICINE

## 2023-10-05 PROCEDURE — 84443 ASSAY THYROID STIM HORMONE: CPT | Performed by: FAMILY MEDICINE

## 2023-10-05 PROCEDURE — 82306 VITAMIN D 25 HYDROXY: CPT | Performed by: FAMILY MEDICINE

## 2023-10-05 PROCEDURE — 85027 COMPLETE CBC AUTOMATED: CPT | Performed by: FAMILY MEDICINE

## 2023-10-05 PROCEDURE — 83036 HEMOGLOBIN GLYCOSYLATED A1C: CPT | Performed by: FAMILY MEDICINE

## 2023-12-05 ENCOUNTER — LAB REQUISITION (OUTPATIENT)
Dept: LAB | Facility: HOSPITAL | Age: 88
End: 2023-12-05
Payer: MEDICARE

## 2023-12-05 DIAGNOSIS — E55.9 VITAMIN D DEFICIENCY, UNSPECIFIED: ICD-10-CM

## 2023-12-05 LAB — DEPRECATED CALCIDIOL+CALCIFEROL SERPL-MC: 34.3 NG/ML (ref 30–80)

## 2023-12-05 PROCEDURE — 82306 VITAMIN D 25 HYDROXY: CPT | Performed by: FAMILY MEDICINE

## 2024-01-30 ENCOUNTER — LAB REQUISITION (OUTPATIENT)
Dept: LAB | Facility: HOSPITAL | Age: 89
End: 2024-01-30
Payer: MEDICARE

## 2024-01-30 DIAGNOSIS — E55.9 VITAMIN D DEFICIENCY, UNSPECIFIED: ICD-10-CM

## 2024-01-30 LAB — DEPRECATED CALCIDIOL+CALCIFEROL SERPL-MC: 45.7 NG/ML (ref 30–80)

## 2024-01-30 PROCEDURE — 82306 VITAMIN D 25 HYDROXY: CPT | Performed by: FAMILY MEDICINE

## 2025-02-10 NOTE — PROGRESS NOTES
[facility]  Hospital Medicine Progress Note        Chief Complaint: Inpatient Follow-up for     HPI: 87 y.o. 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 due to increased lethargy x1 day. Patient was COVID positive on 7/5/22. She had labs done at the NH and was given IV fluids for dehydration. She is not the best historian.     Initial ED VS include BP 95/50, HR 65, RR 18, SpO2 100%, temp 96.3F. Labs notable for WBC 16.1, potassium 3.4, chloride 115, CO2 13, glucose 78, magnesium 1.4, COVID positive. Blood cultures x2 pending. CXR revealed a left sided pleural effusion, also concerning for pneumonia. She was started on IV Levaquin. There was mention of her possibly needing a PEG tube, which family members are undecided about    Interval Hx:   Patient seen and examined this morning with patient's daughter bedside   Objective/physical exam:  General: In no acute distress, afebrile  Chest: Clear to auscultation bilaterally  Heart: RRR, +S1, S2, no appreciable murmur  Abdomen: Soft, nontender, BS +  MSK: Warm, no lower extremity edema, no clubbing or cyanosis  Neurologic: Alert and awake    VITAL SIGNS: 24 HRS MIN & MAX LAST   Temp  Min: 98.2 °F (36.8 °C)  Max: 98.9 °F (37.2 °C) 98.7 °F (37.1 °C)   BP  Min: 109/70  Max: 121/86 109/70   Pulse  Min: 63  Max: 102  63   Resp  Min: 18  Max: 21 (!) 21   SpO2  Min: 95 %  Max: 99 % 95 %       Recent Labs   Lab 08/01/22  0452 08/02/22  0459 08/03/22  0430   WBC 6.3 5.4 5.1   RBC 3.60* 3.80* 3.76*   HGB 10.9* 11.3* 11.5*   HCT 34.1* 35.8* 35.9*   MCV 94.7* 94.2* 95.5*   MCH 30.3 29.7 30.6   MCHC 32.0* 31.6* 32.0*   RDW 13.9 13.6 13.8    161 167   MPV 9.7 9.2 9.3       Recent Labs   Lab 07/29/22  1402 07/29/22  2115 07/30/22  0811 07/30/22  1112  07/30/22  1247 07/30/22  1545 07/31/22  0447 08/01/22  0452 08/02/22  0459 08/03/22  0430     --  124*   < >  --    < > 135* 136 136 139   K 3.4*  --  3.3*  --   --    < > 2.6* 4.1 3.5 3.4*   CO2 13*  --  15*  --   --    < > 22* 22* 21* 23   BUN 18.6  --  15.3  --   --    < > 10.1 5.0* 3.1* 3.1*   CREATININE 0.89  --  0.87  --   --    < > 0.82 0.79 0.73 0.77   CALCIUM 7.3*  --  8.3*  --   --    < > 7.5* 7.5* 7.9* 7.5*   PH  --  7.38  --   --  7.58*  --   --   --   --   --    MG 1.40*  --  1.20*  --   --   --   --  1.40*  --   --    ALBUMIN 2.5*  --  2.7*  --   --   --  2.1* 2.3*  --   --    ALKPHOS 52  --  46  --   --   --  47 50  --   --    ALT 71*  --  63*  --   --   --  47 44  --   --    AST 22  --  27  --   --   --  17 21  --   --    BILITOT 0.5  --  1.1  --   --   --  0.5 0.5  --   --     < > = values in this interval not displayed.          Microbiology Results (last 7 days)     Procedure Component Value Units Date/Time    Blood culture #1 **CANNOT BE ORDERED STAT** [[id number]]  (Normal) Collected: 07/29/22 1406    Order Status: Completed Specimen: Blood from Hand, Right Updated: 08/02/22 1503     CULTURE, BLOOD ([facility]) No Growth At 96 Hours    Blood culture #2 **CANNOT BE ORDERED STAT** [[id number]]  (Abnormal)  (Susceptibility) Collected: 07/29/22 1406    Order Status: Completed Specimen: Blood from Arm, Right Updated: 08/01/22 0642     CULTURE, BLOOD ([facility]) Staphylococcus auricularis     GRAM STAIN Gram Positive Cocci, probable Staphylococcus      Seen in gram stain of broth only      1 of 1 Pediatric bottle positive     Respiratory Culture [[id number]] Collected: 07/29/22 2147    Order Status: Canceled Specimen: Sputum, Expectorated Updated: 07/29/22 2149           See below for Radiology    Scheduled Med:   atorvastatin  40 mg Oral Daily    cefTRIAXone (ROCEPHIN) IVPB  1 g Intravenous Q24H    clopidogreL  75 mg Oral Daily    enoxaparin  30 mg Subcutaneous Daily    magnesium oxide  400 mg Oral BID     metronidazole  500 mg Intravenous Q8H    sodium chloride 0.9%  10 mL Intravenous Q6H    traZODone  100 mg Oral QHS        Continuous Infusions:   Amino acid 4.25% - dextrose 5% (CLINIMIX-E) solution (1L provides 42.5 gm AA, 50 gm CHO (170 kcal/L dextrose), Na 35, K 30, Mg 5, Ca 4.5, Acetate 70, Cl 39, Phos 15)          PRN Meds:  acetaminophen, acetaminophen, clonazePAM, dextrose 50%, dextrose 50%, glucagon (human recombinant), glucose, glucose, insulin aspart U-100, ondansetron, Flushing PICC Protocol **AND** sodium chloride 0.9% **AND** sodium chloride 0.9%       Assessment/Plan:  Sepsis  Lactic acidosis  COVID-19 positive  UTI present on admission with Proteus  Blood cultures positive for Staph most likely contaminant  Metabolic acidosis  Proctitis  Hypokalemia  Hypomagnesemia and hypophosphatemia  Left-sided pleural effusion  Diabetes mellitus type 2  History of anxiety  Peripheral vascular disease  Major depression, insomnia, hypertension, hyperlipidemia, history of coronary artery disease  History of CVA with left-sided hemiplegia  Constipation  Psoriasis    Again had a long discussion with patient's daughter and patient initially patient was listening but later she closed her eyes  Patient's daughter reported that she will talk to other 4 siblings and they will have the answers for us for tomorrow if they want a feeding tube for her but at this time patient is in her sound mind  Continue with Rocephin tolerating it well and we are giving Flagyl for proctitis Rocephin and Flagyl should cover that  We discontinued vancomycin because blood cultures grew Staph auricularis 1 out of the water most likely a contaminant  Palliative care following  Patient does not want to eat anything and she appears to be in her right mind at this time  On Clinimix for nutrition  Venous ultrasound was negative for DVT    VTE prophylaxis:  Lovenox    Patient condition:  Stable/Fair/Guarded/ Serious/ Critical    Anticipated  discharge and Disposition:         All diagnosis and differential diagnosis have been reviewed; assessment and plan has been documented; I have personally reviewed the labs and test results that are presently available; I have reviewed the patients medication list; I have reviewed the consulting providers response and recommendations. I have reviewed or attempted to review medical records based upon their availability    All of the patient's questions have been  addressed and answered. Patient's is agreeable to the above stated plan. I will continue to monitor closely and make adjustments to medical management as needed.  _____________________________________________________________________    Nutrition Status:    Radiology:  Fl Modified Barium Swallow Speech  See procedure notes from Speech Pathologist.    This procedure was auto-finalized.      Tamika Abreu MD   08/03/2022                   Tamika Abreu MD   08/03/2022                normal rate, regular rhythm, and no murmur.